# Patient Record
Sex: FEMALE | URBAN - METROPOLITAN AREA
[De-identification: names, ages, dates, MRNs, and addresses within clinical notes are randomized per-mention and may not be internally consistent; named-entity substitution may affect disease eponyms.]

---

## 2017-09-07 ENCOUNTER — CHARTING TRANS (OUTPATIENT)
Dept: OTOLARYNGOLOGY | Age: 47
End: 2017-09-07

## 2017-09-07 ENCOUNTER — LAB SERVICES (OUTPATIENT)
Dept: OTHER | Age: 47
End: 2017-09-07

## 2017-09-09 LAB — FINAL REPORT: ABNORMAL

## 2017-09-12 ENCOUNTER — CHARTING TRANS (OUTPATIENT)
Dept: OTHER | Age: 47
End: 2017-09-12

## 2017-09-13 ENCOUNTER — CHARTING TRANS (OUTPATIENT)
Dept: OTHER | Age: 47
End: 2017-09-13

## 2017-09-25 ENCOUNTER — CHARTING TRANS (OUTPATIENT)
Dept: OTOLARYNGOLOGY | Age: 47
End: 2017-09-25

## 2017-09-25 ENCOUNTER — CHARTING TRANS (OUTPATIENT)
Dept: OTHER | Age: 47
End: 2017-09-25

## 2017-10-16 ENCOUNTER — CHARTING TRANS (OUTPATIENT)
Dept: OTOLARYNGOLOGY | Age: 47
End: 2017-10-16

## 2017-10-30 ENCOUNTER — CHARTING TRANS (OUTPATIENT)
Dept: OTOLARYNGOLOGY | Age: 47
End: 2017-10-30

## 2017-10-30 ENCOUNTER — CHARTING TRANS (OUTPATIENT)
Dept: OTHER | Age: 47
End: 2017-10-30

## 2018-01-05 ENCOUNTER — CHARTING TRANS (OUTPATIENT)
Dept: OTHER | Age: 48
End: 2018-01-05

## 2018-08-14 ENCOUNTER — CHARTING TRANS (OUTPATIENT)
Dept: OTHER | Age: 48
End: 2018-08-14

## 2018-08-14 ENCOUNTER — LAB SERVICES (OUTPATIENT)
Dept: OTHER | Age: 48
End: 2018-08-14

## 2018-08-14 LAB
BUN SERPL-MCNC: 16 MG/DL (ref 7–20)
CALCIUM SERPL-MCNC: 10.6 MG/DL (ref 8.6–10.6)
CHLORIDE SERPL-SCNC: 105 MMOL/L (ref 96–107)
CREAT SERPL-MCNC: 1.2 MG/DL (ref 0.5–1.4)
GFR SERPL CREATININE-BSD FRML MDRD: 50 ML/MIN/{1.73M2}
GFR SERPL CREATININE-BSD FRML MDRD: >60 ML/MIN/{1.73M2}
GLUCOSE SERPL-MCNC: 88 MG/DL (ref 70–200)
HCO3 SERPL-SCNC: 26 MMOL/L (ref 22–32)
POTASSIUM SERPL-SCNC: 4.5 MMOL/L (ref 3.5–5.3)
SODIUM SERPL-SCNC: 141 MMOL/L (ref 136–146)

## 2018-08-15 LAB — MAGNESIUM SERPL-MCNC: 1.6 MG/DL (ref 1.6–2.6)

## 2018-09-08 ENCOUNTER — LAB SERVICES (OUTPATIENT)
Dept: OTHER | Age: 48
End: 2018-09-08

## 2018-09-08 LAB — MAGNESIUM SERPL-MCNC: 1.6 MG/DL (ref 1.6–2.6)

## 2018-09-10 ENCOUNTER — CHARTING TRANS (OUTPATIENT)
Dept: OTHER | Age: 48
End: 2018-09-10

## 2018-09-11 ENCOUNTER — LAB SERVICES (OUTPATIENT)
Dept: OTHER | Age: 48
End: 2018-09-11

## 2018-09-11 ENCOUNTER — CHARTING TRANS (OUTPATIENT)
Dept: OTHER | Age: 48
End: 2018-09-11

## 2018-09-11 LAB
25(OH)D3 SERPL-MCNC: 38.1 NG/ML (ref 30–100)
ALBUMIN SERPL BCG-MCNC: 4.4 G/DL (ref 3.6–5.1)
ALP SERPL-CCNC: 120 U/L (ref 45–130)
ALT SERPL W/O P-5'-P-CCNC: 10 U/L (ref 7–34)
AST SERPL-CCNC: 18 U/L (ref 9–37)
BACTERIA: ABNORMAL
BILIRUB SERPL-MCNC: 0.8 MG/DL (ref 0–1)
BILIRUBIN URINE: NEGATIVE
BLOOD URINE: ABNORMAL
BUN SERPL-MCNC: 23 MG/DL (ref 7–20)
CALCIUM SERPL-MCNC: 9.8 MG/DL (ref 8.6–10.6)
CHLORIDE SERPL-SCNC: 106 MMOL/L (ref 96–107)
CLARITY: CLEAR
COLOR: YELLOW
CREAT SERPL-MCNC: 1 MG/DL (ref 0.5–1.4)
CREAT UR-MCNC: 165.4 MG/DL (ref 30–125)
DIFFERENTIAL TYPE: ABNORMAL
GFR SERPL CREATININE-BSD FRML MDRD: 56 ML/MIN/{1.73M2}
GFR SERPL CREATININE-BSD FRML MDRD: >60 ML/MIN/{1.73M2}
GLUCOSE P FAST SERPL-MCNC: 85 MG/DL (ref 60–100)
GLUCOSE QUALITATIVE U: NEGATIVE
HCO3 SERPL-SCNC: 25 MMOL/L (ref 22–32)
HEMATOCRIT: 41.4 % (ref 34–45)
HEMOGLOBIN: 13.5 G/DL (ref 11.2–15.7)
KETONES, URINE: NEGATIVE
LEUKOCYTE ESTERASE URINE: NEGATIVE
LYMPH PERCENT: 37 % (ref 20.5–51.1)
LYMPHOCYTE ABSOLUTE #: 2.7 10*3/UL (ref 1.2–3.4)
MEAN CORPUSCULAR HGB CONCENTRATION: 32.6 % (ref 32–36)
MEAN CORPUSCULAR HGB: 26.6 PG (ref 27–34)
MEAN CORPUSCULAR VOLUME: 81.7 FL (ref 79–95)
MEAN PLATELET VOLUME: 8.6 FL (ref 8.6–12.4)
MIXED %: 6.4 % (ref 4.3–12.9)
MIXED ABSOLUTE #: 0.5 10*3/UL (ref 0.2–0.9)
NEUTROPHIL ABSOLUTE #: 4.2 10*3/UL (ref 1.4–6.5)
NEUTROPHIL PERCENT: 56.6 % (ref 34–73.5)
NITRITE URINE: NEGATIVE
PH URINE: 5.5 (ref 5–7)
PHOSPHATE SERPL-MCNC: 3.8 MG/DL (ref 2.5–4.9)
PLATELET COUNT: 330 10*3/UL (ref 150–400)
POTASSIUM SERPL-SCNC: 4.5 MMOL/L (ref 3.5–5.3)
PROT SERPL-MCNC: 7.7 G/DL (ref 6.2–8.1)
PROT UR-MCNC: 6 MG/DL (ref 0–12)
PROT/CREAT 24H UR: 0.04 MG/MG (ref 0–0.2)
RED BLOOD CELL COUNT: 5.07 10*6/UL (ref 3.7–5.2)
RED BLOOD CELLS URINE: ABNORMAL (ref 0–3)
RED CELL DISTRIBUTION WIDTH: 13.6 % (ref 11.3–14.8)
SODIUM SERPL-SCNC: 141 MMOL/L (ref 136–146)
SPECIFIC GRAVITY URINE: >=1.03 (ref 1–1.03)
SQUAMOUS EPITHELIAL CELLS: ABNORMAL
URINE PROTEIN, QUAL (DIPSTICK): NEGATIVE
UROBILINOGEN URINE: 0.2
WHITE BLOOD CELL COUNT: 7.4 10*3/UL (ref 4–10)
WHITE BLOOD CELLS URINE: ABNORMAL (ref 0–5)

## 2018-09-12 LAB
MYCOPHENOLATE SERPL-MCNC: 1.9 UG/ML (ref 1–3.5)
TACROLIMUS BLD-MCNC: 6.4 NG/ML (ref 5–20)

## 2018-09-13 LAB — FAX RESULTS: NORMAL

## 2018-10-18 ENCOUNTER — LAB SERVICES (OUTPATIENT)
Dept: OTHER | Age: 48
End: 2018-10-18

## 2018-10-18 LAB — MAGNESIUM SERPL-MCNC: 1.7 MG/DL (ref 1.6–2.6)

## 2018-10-26 ENCOUNTER — ANCILLARY ORDERS (OUTPATIENT)
Dept: OTHER | Age: 48
End: 2018-10-26

## 2018-10-26 DIAGNOSIS — M25.522 PAIN IN LEFT ELBOW: ICD-10-CM

## 2018-11-23 ENCOUNTER — IMAGING SERVICES (OUTPATIENT)
Dept: OTHER | Age: 48
End: 2018-11-23

## 2018-11-28 VITALS — WEIGHT: 184 LBS | HEIGHT: 64 IN | BODY MASS INDEX: 31.41 KG/M2

## 2018-11-28 VITALS — WEIGHT: 185 LBS | BODY MASS INDEX: 31.58 KG/M2 | HEIGHT: 64 IN

## 2019-03-05 VITALS
TEMPERATURE: 97.9 F | OXYGEN SATURATION: 98 % | WEIGHT: 188 LBS | HEIGHT: 64 IN | HEART RATE: 88 BPM | DIASTOLIC BLOOD PRESSURE: 84 MMHG | SYSTOLIC BLOOD PRESSURE: 118 MMHG | BODY MASS INDEX: 32.1 KG/M2

## 2022-01-04 ENCOUNTER — OFFICE VISIT (OUTPATIENT)
Dept: FAMILY MEDICINE CLINIC | Facility: CLINIC | Age: 52
End: 2022-01-04
Payer: COMMERCIAL

## 2022-01-04 VITALS
BODY MASS INDEX: 31.92 KG/M2 | WEIGHT: 187 LBS | TEMPERATURE: 98 F | RESPIRATION RATE: 16 BRPM | DIASTOLIC BLOOD PRESSURE: 84 MMHG | HEART RATE: 72 BPM | HEIGHT: 64 IN | SYSTOLIC BLOOD PRESSURE: 140 MMHG

## 2022-01-04 DIAGNOSIS — R25.2 CRAMPING OF FEET: ICD-10-CM

## 2022-01-04 DIAGNOSIS — R10.9 LEFT LATERAL ABDOMINAL PAIN: Primary | ICD-10-CM

## 2022-01-04 DIAGNOSIS — Z12.31 ENCOUNTER FOR SCREENING MAMMOGRAM FOR MALIGNANT NEOPLASM OF BREAST: ICD-10-CM

## 2022-01-04 DIAGNOSIS — M54.50 ACUTE LEFT-SIDED LOW BACK PAIN WITHOUT SCIATICA: ICD-10-CM

## 2022-01-04 DIAGNOSIS — Z94.0 KIDNEY TRANSPLANT RECIPIENT: ICD-10-CM

## 2022-01-04 DIAGNOSIS — R25.2 CRAMPING OF HANDS: ICD-10-CM

## 2022-01-04 PROBLEM — E66.9 OBESITY (BMI 30-39.9): Status: ACTIVE | Noted: 2018-03-05

## 2022-01-04 PROBLEM — R93.1 ABNORMAL ECHOCARDIOGRAM: Status: ACTIVE | Noted: 2018-03-28

## 2022-01-04 PROBLEM — E78.5 HYPERLIPIDEMIA: Status: ACTIVE | Noted: 2018-08-14

## 2022-01-04 PROBLEM — R01.1 CARDIAC MURMUR: Status: ACTIVE | Noted: 2018-03-05

## 2022-01-04 PROCEDURE — 3008F BODY MASS INDEX DOCD: CPT | Performed by: STUDENT IN AN ORGANIZED HEALTH CARE EDUCATION/TRAINING PROGRAM

## 2022-01-04 PROCEDURE — 99203 OFFICE O/P NEW LOW 30 MIN: CPT | Performed by: STUDENT IN AN ORGANIZED HEALTH CARE EDUCATION/TRAINING PROGRAM

## 2022-01-04 PROCEDURE — 3079F DIAST BP 80-89 MM HG: CPT | Performed by: STUDENT IN AN ORGANIZED HEALTH CARE EDUCATION/TRAINING PROGRAM

## 2022-01-04 PROCEDURE — 3077F SYST BP >= 140 MM HG: CPT | Performed by: STUDENT IN AN ORGANIZED HEALTH CARE EDUCATION/TRAINING PROGRAM

## 2022-01-04 RX ORDER — TIZANIDINE 4 MG/1
4 TABLET ORAL EVERY 8 HOURS PRN
Qty: 30 TABLET | Refills: 0 | Status: SHIPPED | OUTPATIENT
Start: 2022-01-04

## 2022-01-04 RX ORDER — ONDANSETRON 4 MG/1
1 TABLET, ORALLY DISINTEGRATING ORAL EVERY 8 HOURS PRN
COMMUNITY
Start: 2019-09-25 | End: 2022-01-04 | Stop reason: ALTCHOICE

## 2022-01-04 RX ORDER — ONDANSETRON 4 MG/1
4 TABLET, ORALLY DISINTEGRATING ORAL EVERY 8 HOURS PRN
Qty: 30 TABLET | Refills: 0 | Status: SHIPPED | OUTPATIENT
Start: 2022-01-04

## 2022-01-04 NOTE — PROGRESS NOTES
[facility] Family Medicine Office Note    HPI:     Remedios Nicholson is a 46year old female w/ hx focal scleral glomerulonephritis kidney transplant presenting for left lateral abdominal pain and back pain as well as cramping sensation in hands/f Cancer Paternal Grandfather    • Cancer Other    • Hypertension Mother    • Other (Osteoporosis [Other]) Mother       Social History: Social History    Tobacco Use      Smoking status: Never Smoker      Smokeless tobacco: Never Used    Vaping Use      Vapi encounter: 5' 4\" (1.626 m). Weight as of this encounter: 187 lb (84.8 kg). Vital signs reviewed. Appears stated age, well groomed. Physical Exam  Constitutional:       General: She is not in acute distress. Appearance: Normal appearance.    SADIQ quant viral load and BK virus DNA Quant PCR).      [facility] initially faxed over labs to [facility], however patient prefers [facility] Transplant Service at [phone] and endorsed to representative to fax over labs to [facility] at [facility]

## 2022-01-04 NOTE — PATIENT INSTRUCTIONS
Abdominal Pain  Abdominal pain is pain in the stomach or belly area. Everyone has this pain from time to time. In many cases it goes away on its own. But abdominal pain can sometimes be due to a serious problem, such as appendicitis.  So it’s important to vitamins, and supplements. Treating abdominal pain  Some causes of pain need emergency medical treatment right away. These include appendicitis or a bowel blockage. Other problems can be treated with rest, fluids, or medicines.  Your healthcare provider ca lie down. · Raise the head of your bed. Sussy last reviewed this educational content on 4/1/2019  © 3530-4605 [employer]. All rights reserved. This information is not intended as a substitute for professional medical care.  Always follow

## 2022-01-05 LAB
ALBUMIN/GLOBULIN RATIO: 1.3 (CALC) (ref 1–2.5)
ALBUMIN: 4.5 G/DL (ref 3.6–5.1)
ALKALINE PHOSPHATASE: 118 U/L (ref 37–153)
ALT: 15 U/L (ref 6–29)
AST: 20 U/L (ref 10–35)
BILIRUBIN, DIRECT: 0.2 MG/DL
BILIRUBIN, INDIRECT: 0.7 MG/DL (CALC) (ref 0.2–1.2)
BILIRUBIN, TOTAL: 0.9 MG/DL (ref 0.2–1.2)
GLOBULIN: 3.4 G/DL (CALC) (ref 1.9–3.7)
MAGNESIUM: 1.8 MG/DL (ref 1.5–2.5)
PROTEIN, TOTAL: 7.9 G/DL (ref 6.1–8.1)

## 2022-01-10 ENCOUNTER — OFFICE VISIT (OUTPATIENT)
Dept: NEPHROLOGY | Facility: CLINIC | Age: 52
End: 2022-01-10
Payer: COMMERCIAL

## 2022-01-10 VITALS
DIASTOLIC BLOOD PRESSURE: 88 MMHG | HEART RATE: 83 BPM | OXYGEN SATURATION: 99 % | RESPIRATION RATE: 17 BRPM | WEIGHT: 188.38 LBS | BODY MASS INDEX: 32.16 KG/M2 | HEIGHT: 64 IN | SYSTOLIC BLOOD PRESSURE: 130 MMHG

## 2022-01-10 DIAGNOSIS — Z94.0 RENAL TRANSPLANT RECIPIENT: Primary | ICD-10-CM

## 2022-01-10 PROCEDURE — 99203 OFFICE O/P NEW LOW 30 MIN: CPT | Performed by: INTERNAL MEDICINE

## 2022-01-10 PROCEDURE — 3079F DIAST BP 80-89 MM HG: CPT | Performed by: INTERNAL MEDICINE

## 2022-01-10 PROCEDURE — 3008F BODY MASS INDEX DOCD: CPT | Performed by: INTERNAL MEDICINE

## 2022-01-10 PROCEDURE — 3075F SYST BP GE 130 - 139MM HG: CPT | Performed by: INTERNAL MEDICINE

## 2022-01-10 NOTE — PROGRESS NOTES
Consult Note      REASON FOR CONSULT: Renal transplant recipient         HPI:   Naomi Patrick is a 46year old female with Patient presents with:  Consult: ref by Dr Marisela Brambila / MD Isaak Salcido was seen in nephro • Amoxicillin-Pot Clavulanate (AUGMENTIN) 500-125 MG Oral Tab Take 1 tablet by mouth 3 (three) times daily.  (Patient not taking: No sig reported)           Allergies:    Hydrocodone-Acetami*    OTHER (SEE COMMENTS), ANXIETY,                            NA blood and negative protein     ASSESSMENT/PLAN:        #1.  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   Fortunately she is done very well over the years and her most recent creatinine was normal.  Prograf level wa

## 2022-01-22 ENCOUNTER — HOSPITAL ENCOUNTER (OUTPATIENT)
Dept: MAMMOGRAPHY | Facility: HOSPITAL | Age: 52
Discharge: HOME | End: 2022-01-22
Attending: STUDENT IN AN ORGANIZED HEALTH CARE EDUCATION/TRAINING PROGRAM
Payer: COMMERCIAL

## 2022-01-22 DIAGNOSIS — Z12.31 ENCOUNTER FOR SCREENING MAMMOGRAM FOR MALIGNANT NEOPLASM OF BREAST: ICD-10-CM

## 2022-01-22 PROCEDURE — 77063 BREAST TOMOSYNTHESIS BI: CPT | Performed by: STUDENT IN AN ORGANIZED HEALTH CARE EDUCATION/TRAINING PROGRAM

## 2022-01-22 PROCEDURE — 77067 SCR MAMMO BI INCL CAD: CPT | Performed by: STUDENT IN AN ORGANIZED HEALTH CARE EDUCATION/TRAINING PROGRAM

## 2022-06-10 ENCOUNTER — TELEPHONE (OUTPATIENT)
Dept: FAMILY MEDICINE CLINIC | Facility: CLINIC | Age: 52
End: 2022-06-10

## 2022-06-27 ENCOUNTER — OFFICE VISIT (OUTPATIENT)
Dept: FAMILY MEDICINE CLINIC | Facility: CLINIC | Age: 52
End: 2022-06-27
Payer: COMMERCIAL

## 2022-06-27 VITALS
SYSTOLIC BLOOD PRESSURE: 120 MMHG | BODY MASS INDEX: 31.07 KG/M2 | OXYGEN SATURATION: 99 % | HEIGHT: 64 IN | DIASTOLIC BLOOD PRESSURE: 80 MMHG | TEMPERATURE: 98 F | WEIGHT: 182 LBS | RESPIRATION RATE: 16 BRPM | HEART RATE: 64 BPM

## 2022-06-27 DIAGNOSIS — Z11.51 SCREENING FOR HPV (HUMAN PAPILLOMAVIRUS): ICD-10-CM

## 2022-06-27 DIAGNOSIS — Z12.4 SCREENING FOR CERVICAL CANCER: ICD-10-CM

## 2022-06-27 DIAGNOSIS — Z12.83 SKIN EXAM, SCREENING FOR CANCER: ICD-10-CM

## 2022-06-27 DIAGNOSIS — Z23 NEED FOR VACCINATION AGAINST STREPTOCOCCUS PNEUMONIAE USING PNEUMOCOCCAL CONJUGATE VACCINE 13: ICD-10-CM

## 2022-06-27 DIAGNOSIS — Z23 NEED FOR SHINGLES VACCINE: ICD-10-CM

## 2022-06-27 DIAGNOSIS — Z00.00 ANNUAL PHYSICAL EXAM: Primary | ICD-10-CM

## 2022-06-27 DIAGNOSIS — Z12.11 COLON CANCER SCREENING: ICD-10-CM

## 2022-06-27 DIAGNOSIS — Z78.0 POST-MENOPAUSAL: ICD-10-CM

## 2022-06-27 DIAGNOSIS — Z00.00 LABORATORY EXAM ORDERED AS PART OF ROUTINE GENERAL MEDICAL EXAMINATION: ICD-10-CM

## 2022-06-27 PROCEDURE — 3074F SYST BP LT 130 MM HG: CPT | Performed by: STUDENT IN AN ORGANIZED HEALTH CARE EDUCATION/TRAINING PROGRAM

## 2022-06-27 PROCEDURE — 3008F BODY MASS INDEX DOCD: CPT | Performed by: STUDENT IN AN ORGANIZED HEALTH CARE EDUCATION/TRAINING PROGRAM

## 2022-06-27 PROCEDURE — 3079F DIAST BP 80-89 MM HG: CPT | Performed by: STUDENT IN AN ORGANIZED HEALTH CARE EDUCATION/TRAINING PROGRAM

## 2022-06-27 PROCEDURE — 99396 PREV VISIT EST AGE 40-64: CPT | Performed by: STUDENT IN AN ORGANIZED HEALTH CARE EDUCATION/TRAINING PROGRAM

## 2022-06-27 RX ORDER — MULTIVIT-MIN/IRON/FOLIC ACID/K 18-600-40
CAPSULE ORAL
COMMUNITY

## 2022-06-28 LAB
ABSOLUTE BASOPHILS: 23 CELLS/UL (ref 0–200)
ABSOLUTE EOSINOPHILS: 158 CELLS/UL (ref 15–500)
ABSOLUTE LYMPHOCYTES: 3315 CELLS/UL (ref 850–3900)
ABSOLUTE MONOCYTES: 473 CELLS/UL (ref 200–950)
ABSOLUTE NEUTROPHILS: 3533 CELLS/UL (ref 1500–7800)
ALBUMIN/GLOBULIN RATIO: 1.4 (CALC) (ref 1–2.5)
ALBUMIN: 4.5 G/DL (ref 3.6–5.1)
ALKALINE PHOSPHATASE: 113 U/L (ref 37–153)
ALT: 12 U/L (ref 6–29)
APPEARANCE: CLEAR
AST: 18 U/L (ref 10–35)
BASOPHILS: 0.3 %
BILIRUBIN, TOTAL: 1 MG/DL (ref 0.2–1.2)
BILIRUBIN: NEGATIVE
BUN: 17 MG/DL (ref 7–25)
CALCIUM: 10 MG/DL (ref 8.6–10.4)
CARBON DIOXIDE: 28 MMOL/L (ref 20–32)
CHLORIDE: 107 MMOL/L (ref 98–110)
CHOL/HDLC RATIO: 3.4 (CALC)
CHOLESTEROL, TOTAL: 222 MG/DL
COLOR: YELLOW
CREATININE, RANDOM URINE: 232 MG/DL (ref 20–275)
CREATININE: 1.01 MG/DL (ref 0.5–1.05)
EGFR IF AFRICN AM: 75 ML/MIN/1.73M2
EGFR IF NONAFRICN AM: 64 ML/MIN/1.73M2
EOSINOPHILS: 2.1 %
GLOBULIN: 3.2 G/DL (CALC) (ref 1.9–3.7)
GLUCOSE: 94 MG/DL (ref 65–99)
GLUCOSE: NEGATIVE
HDL CHOLESTEROL: 65 MG/DL
HEMATOCRIT: 41.8 % (ref 35–45)
HEMOGLOBIN A1C: 5.7 % OF TOTAL HGB
HEMOGLOBIN: 13.3 G/DL (ref 11.7–15.5)
KETONES: NEGATIVE
LDL-CHOLESTEROL: 142 MG/DL (CALC)
LEUKOCYTE ESTERASE: NEGATIVE
LYMPHOCYTES: 44.2 %
MCH: 26.2 PG (ref 27–33)
MCHC: 31.8 G/DL (ref 32–36)
MCV: 82.3 FL (ref 80–100)
MICROALBUMIN/CREATININE RATIO, RANDOM URINE: 11 MCG/MG CREAT
MICROALBUMIN: 2.5 MG/DL
MONOCYTES: 6.3 %
MPV: 9.6 FL (ref 7.5–12.5)
NEUTROPHILS: 47.1 %
NITRITE: NEGATIVE
NON-HDL CHOLESTEROL: 157 MG/DL (CALC)
OCCULT BLOOD: NEGATIVE
PLATELET COUNT: 336 THOUSAND/UL (ref 140–400)
POTASSIUM: 4.7 MMOL/L (ref 3.5–5.3)
PROTEIN, TOTAL: 7.7 G/DL (ref 6.1–8.1)
PROTEIN: NEGATIVE
RDW: 13.1 % (ref 11–15)
RED BLOOD CELL COUNT: 5.08 MILLION/UL (ref 3.8–5.1)
SODIUM: 142 MMOL/L (ref 135–146)
SPECIFIC GRAVITY: 1.03 (ref 1–1.03)
TACROLIMUS, HIGHLY SENSITIVE, /MS/MS: 14.8 MCG/L
TRIGLYCERIDES: 55 MG/DL
WHITE BLOOD CELL COUNT: 7.5 THOUSAND/UL (ref 3.8–10.8)

## 2022-06-30 LAB — HPV MRNA E6/E7: DETECTED

## 2022-07-01 ENCOUNTER — TELEPHONE (OUTPATIENT)
Dept: FAMILY MEDICINE CLINIC | Facility: CLINIC | Age: 52
End: 2022-07-01

## 2022-07-01 NOTE — TELEPHONE ENCOUNTER
Spoke to patient. She was really worried about the pap results. I assured her HPV is not uncommon, and at times it could lie dormant. Abnormal cells are low grade rather than high grade. Patient told not to worry over weekend. Labs also reviewed with her and Doc's recommendations. All questions answered. Told her we would call her Tues.

## 2022-07-05 DIAGNOSIS — R87.612 LGSIL ON PAP SMEAR OF CERVIX: Primary | ICD-10-CM

## 2022-07-05 NOTE — TELEPHONE ENCOUNTER
Patient called and informed of pap smear results and provided gyne referral for further management.      Dahiana Chinchilla MD, 07/05/22, 8:41 AM

## 2022-07-07 ENCOUNTER — OFFICE VISIT (OUTPATIENT)
Dept: NEPHROLOGY | Facility: CLINIC | Age: 52
End: 2022-07-07
Payer: COMMERCIAL

## 2022-07-07 ENCOUNTER — TELEPHONE (OUTPATIENT)
Dept: FAMILY MEDICINE CLINIC | Facility: CLINIC | Age: 52
End: 2022-07-07

## 2022-07-07 ENCOUNTER — NURSE ONLY (OUTPATIENT)
Dept: FAMILY MEDICINE CLINIC | Facility: CLINIC | Age: 52
End: 2022-07-07
Payer: COMMERCIAL

## 2022-07-07 VITALS — WEIGHT: 181.13 LBS | BODY MASS INDEX: 31 KG/M2 | SYSTOLIC BLOOD PRESSURE: 130 MMHG | DIASTOLIC BLOOD PRESSURE: 80 MMHG

## 2022-07-07 DIAGNOSIS — Z94.0 RENAL TRANSPLANT RECIPIENT: Primary | ICD-10-CM

## 2022-07-07 DIAGNOSIS — Z23 NEED FOR VACCINATION AGAINST STREPTOCOCCUS PNEUMONIAE USING PNEUMOCOCCAL CONJUGATE VACCINE 13: Primary | ICD-10-CM

## 2022-07-07 DIAGNOSIS — Z12.11 SCREENING FOR COLON CANCER: Primary | ICD-10-CM

## 2022-07-07 DIAGNOSIS — Z23 NEED FOR SHINGLES VACCINE: ICD-10-CM

## 2022-07-07 DIAGNOSIS — Z94.0 KIDNEY TRANSPLANT RECIPIENT: Primary | ICD-10-CM

## 2022-07-07 PROCEDURE — 90471 IMMUNIZATION ADMIN: CPT | Performed by: STUDENT IN AN ORGANIZED HEALTH CARE EDUCATION/TRAINING PROGRAM

## 2022-07-07 PROCEDURE — 3079F DIAST BP 80-89 MM HG: CPT | Performed by: INTERNAL MEDICINE

## 2022-07-07 PROCEDURE — 99214 OFFICE O/P EST MOD 30 MIN: CPT | Performed by: INTERNAL MEDICINE

## 2022-07-07 PROCEDURE — 90750 HZV VACC RECOMBINANT IM: CPT | Performed by: STUDENT IN AN ORGANIZED HEALTH CARE EDUCATION/TRAINING PROGRAM

## 2022-07-07 PROCEDURE — 3075F SYST BP GE 130 - 139MM HG: CPT | Performed by: INTERNAL MEDICINE

## 2022-07-07 PROCEDURE — 90472 IMMUNIZATION ADMIN EACH ADD: CPT | Performed by: STUDENT IN AN ORGANIZED HEALTH CARE EDUCATION/TRAINING PROGRAM

## 2022-07-07 PROCEDURE — 90677 PCV20 VACCINE IM: CPT | Performed by: STUDENT IN AN ORGANIZED HEALTH CARE EDUCATION/TRAINING PROGRAM

## 2022-07-07 NOTE — TELEPHONE ENCOUNTER
Pt stopped by the  to request a referral for nephrology. Pt states she needs a referral for appointments through January.      Raymond Raya NPI  [id number]  Address  . [id number],  [address] [place] [address]  [id number]  Phone Number  [phone]

## 2022-07-07 NOTE — TELEPHONE ENCOUNTER
Previous gastro referral out of network, placing new referral.     Kandice Garcia MD, 07/07/22, 8:19 AM

## 2022-07-14 ENCOUNTER — MED REC SCAN ONLY (OUTPATIENT)
Dept: FAMILY MEDICINE CLINIC | Facility: CLINIC | Age: 52
End: 2022-07-14

## 2022-07-18 ENCOUNTER — TELEPHONE (OUTPATIENT)
Dept: GASTROENTEROLOGY | Facility: CLINIC | Age: 52
End: 2022-07-18

## 2022-07-18 ENCOUNTER — OFFICE VISIT (OUTPATIENT)
Dept: GASTROENTEROLOGY | Facility: CLINIC | Age: 52
End: 2022-07-18
Payer: COMMERCIAL

## 2022-07-18 VITALS
HEIGHT: 64 IN | WEIGHT: 182 LBS | SYSTOLIC BLOOD PRESSURE: 111 MMHG | BODY MASS INDEX: 31.07 KG/M2 | HEART RATE: 83 BPM | DIASTOLIC BLOOD PRESSURE: 70 MMHG

## 2022-07-18 DIAGNOSIS — K21.9 GASTROESOPHAGEAL REFLUX DISEASE, UNSPECIFIED WHETHER ESOPHAGITIS PRESENT: ICD-10-CM

## 2022-07-18 DIAGNOSIS — Z12.11 COLON CANCER SCREENING: Primary | ICD-10-CM

## 2022-07-18 DIAGNOSIS — Z12.11 ENCOUNTER FOR SCREENING COLONOSCOPY: Primary | ICD-10-CM

## 2022-07-18 RX ORDER — POLYETHYLENE GLYCOL 3350, SODIUM CHLORIDE, SODIUM BICARBONATE, POTASSIUM CHLORIDE 420; 11.2; 5.72; 1.48 G/4L; G/4L; G/4L; G/4L
POWDER, FOR SOLUTION ORAL
Qty: 1 EACH | Refills: 0 | Status: SHIPPED | OUTPATIENT
Start: 2022-07-18

## 2022-07-18 RX ORDER — MULTIVITAMIN
1 TABLET ORAL DAILY
COMMUNITY

## 2022-07-18 NOTE — TELEPHONE ENCOUNTER
Scheduled for:  Colonoscopy [address] ,[address]   Provider Name:  Dr. Timothy Banuelos  Date:  9/6/22  Location:  [facility]  Sedation:  Mac   Time:  0930 Am (pt is aware that [facility] will call the day before to confirm arrival time)     Prep:Colyte or Trilyte /Egd ,Dulcolax for 3 days  prior to drinking the bowel prep   Prep instructions were given to pt in the office, pt verbalized understanding. Meds/Allergies Reconciled?:  Physician reviewed     Diagnosis with codes:  Colon scrn - Z12.11 , Debarah Seth - K21.9   Was patient informed to call insurance with codes (Y/N):  Yes, I confirmed [facility]  insurance with the patient. The patient also verbally understands to call her  insurance to check for pre-cert, codes were given on prep instructions. Referral sent?:  Yes   [facility] or [address] notified?:  Electronic case request was sent to [facility] via [employer]. Medication Orders: This patient verbally confirmed that she  is not taking:   Iron, blood thinners, BP meds, and is not diabetic   Not latex allergy, Not PCN allergy and does not have a pacemaker Pt is aware to NOT take iron pills, herbal meds and diet supplements for 7 days before exam. Also to NOT take any form of alcohol, recreational drugs and any forms of ED meds 24 hours before exam.    Misc Orders:  Patient was informed that they will need a COVID 19 test prior to their procedure. Patient verbally understood & will await a phone call from [facility] to schedule.       Further instructions given by staff:

## 2022-07-18 NOTE — PATIENT INSTRUCTIONS
# Average Risk screening: patient is considered average risk for colon cancer (grandma family hx of colon cancer) and it is appropriate to proceed with screening colonoscopy. Patient is currently asymptomatic and denies diarrhea, hematochezia, thin-stools or weight loss. We discussed risks/benefits/alternatives to procedure, including CT colonography and stool testing, they want to proceed with colonoscopy. # acid reflux  # constipation    Recommend:  -Schedule EGD/colonoscopy w/MAC for screening and acid reflux  -Prep: -Buy over the counter dulcolax laxative, and take daily for 3 days prior to drinking the bowel prep.    Split dose Colyte or equivalent    ** If MAC @ [facility]:    - NO alcohol, recreational drugs nor erectile dysfunction mediations 24 hours before procedure(s)   - NO herbal supplements or weight loss medications x 7 days prior to the procedure(s)    ** If MAC @ [facility] or IV twilit - continue all medications as prescribed    COVID 72 hours prior

## 2022-08-02 LAB
BUN/CREATININE RATIO: 17 (CALC) (ref 6–22)
BUN: 19 MG/DL (ref 7–25)
CALCIUM: 10 MG/DL (ref 8.6–10.4)
CARBON DIOXIDE: 25 MMOL/L (ref 20–32)
CHLORIDE: 107 MMOL/L (ref 98–110)
CREATININE: 1.11 MG/DL (ref 0.5–1.03)
EGFR: 60 ML/MIN/1.73M2
GLUCOSE: 88 MG/DL (ref 65–99)
POTASSIUM: 4.3 MMOL/L (ref 3.5–5.3)
SODIUM: 142 MMOL/L (ref 135–146)
TACROLIMUS, HIGHLY SENSITIVE, /MS/MS: 6.4 MCG/L

## 2022-08-04 ENCOUNTER — OFFICE VISIT (OUTPATIENT)
Dept: OBGYN CLINIC | Facility: CLINIC | Age: 52
End: 2022-08-04
Payer: COMMERCIAL

## 2022-08-04 VITALS
SYSTOLIC BLOOD PRESSURE: 128 MMHG | HEART RATE: 78 BPM | DIASTOLIC BLOOD PRESSURE: 76 MMHG | BODY MASS INDEX: 30.93 KG/M2 | WEIGHT: 181.19 LBS | HEIGHT: 64 IN

## 2022-08-04 DIAGNOSIS — R87.810 CERVICAL HIGH RISK HUMAN PAPILLOMAVIRUS (HPV) DNA TEST POSITIVE: ICD-10-CM

## 2022-08-04 DIAGNOSIS — R87.612 PAPANICOLAOU SMEAR OF CERVIX WITH LOW GRADE SQUAMOUS INTRAEPITHELIAL LESION (LGSIL): ICD-10-CM

## 2022-08-04 PROCEDURE — 3078F DIAST BP <80 MM HG: CPT | Performed by: STUDENT IN AN ORGANIZED HEALTH CARE EDUCATION/TRAINING PROGRAM

## 2022-08-04 PROCEDURE — 88342 IMHCHEM/IMCYTCHM 1ST ANTB: CPT | Performed by: STUDENT IN AN ORGANIZED HEALTH CARE EDUCATION/TRAINING PROGRAM

## 2022-08-04 PROCEDURE — 3074F SYST BP LT 130 MM HG: CPT | Performed by: STUDENT IN AN ORGANIZED HEALTH CARE EDUCATION/TRAINING PROGRAM

## 2022-08-04 PROCEDURE — 3008F BODY MASS INDEX DOCD: CPT | Performed by: STUDENT IN AN ORGANIZED HEALTH CARE EDUCATION/TRAINING PROGRAM

## 2022-08-04 PROCEDURE — 88305 TISSUE EXAM BY PATHOLOGIST: CPT | Performed by: STUDENT IN AN ORGANIZED HEALTH CARE EDUCATION/TRAINING PROGRAM

## 2022-08-04 PROCEDURE — 57456 ENDOCERV CURETTAGE W/SCOPE: CPT | Performed by: STUDENT IN AN ORGANIZED HEALTH CARE EDUCATION/TRAINING PROGRAM

## 2022-08-04 NOTE — PROGRESS NOTES
Colposcopy Procedure Note    Date of Procedure: 08/04/22    Pre-procedure diagnosis:   HPV positive pap smear    Post-procedure diagnosis:  same    Procedure:   A discussion was held with patient including diagnosis, prognosis, and management. Recommendation made for colposcopy with possible biopsies. Risks, benefits and alteratives were discussed. The patient agreed to proceed with procedure. She provided written and verbal consent. Dorsal lithotomy position. A speculum was placed in the vagina and the cervix was visualized. The vagina appeared normal with no lesions or discolorations noted. The cervix was then swabbed with acetic acid. Could not visualize the entire squamocolumnar junction - cervix postmenopausal/atrophic  No acetowhite changes. No mosaicism. No punctation. Cervical biopsy: none  Endocervical curettage: taken    Instruments were removed from the vagina. All tissue specimens were sent to pathology. Impression: HOMERO 1 vs HPV changes, pending final pathology     Disposition: RTC pending pathology results.      Yoli Wynne MD   [facility] - OBGYN

## 2022-08-05 ENCOUNTER — TELEPHONE (OUTPATIENT)
Dept: NEPHROLOGY | Facility: CLINIC | Age: 52
End: 2022-08-05

## 2022-08-05 NOTE — TELEPHONE ENCOUNTER
Patient called to let you know she had her labs done on 8/1 and wants to know if she should continue the same dosage of tacrolimus, please advise?

## 2022-08-10 ENCOUNTER — TELEPHONE (OUTPATIENT)
Dept: OBGYN CLINIC | Facility: CLINIC | Age: 52
End: 2022-08-10

## 2022-08-31 ENCOUNTER — TELEPHONE (OUTPATIENT)
Dept: GASTROENTEROLOGY | Facility: CLINIC | Age: 52
End: 2022-08-31

## 2022-08-31 NOTE — TELEPHONE ENCOUNTER
Patient has questions regarding meds for 9/6/2022 CLN & EGD - takes meds at 9am daily. Please call. Thank you.

## 2022-08-31 NOTE — TELEPHONE ENCOUNTER
Spoke with Sheela osorio/ sony davis. Addressed additional questions in relation to medications. Ensured OK to continue tacrolimus (prograf). No further questions/concerns.

## 2022-09-06 ENCOUNTER — TELEPHONE (OUTPATIENT)
Dept: GASTROENTEROLOGY | Facility: CLINIC | Age: 52
End: 2022-09-06

## 2022-09-06 ENCOUNTER — SURGERY CENTER DOCUMENTATION (OUTPATIENT)
Dept: SURGERY | Age: 52
End: 2022-09-06

## 2022-09-06 PROCEDURE — 43239 EGD BIOPSY SINGLE/MULTIPLE: CPT | Performed by: INTERNAL MEDICINE

## 2022-09-06 PROCEDURE — 45378 DIAGNOSTIC COLONOSCOPY: CPT | Performed by: INTERNAL MEDICINE

## 2022-09-06 RX ORDER — PANTOPRAZOLE SODIUM 40 MG/1
40 TABLET, DELAYED RELEASE ORAL
Qty: 90 TABLET | Refills: 3 | Status: SHIPPED | OUTPATIENT
Start: 2022-09-06 | End: 2023-09-01

## 2022-09-06 NOTE — TELEPHONE ENCOUNTER
Esophageal scarring and large hiatal hernia   Likely scarring from acid reflux biopsied for EoE    Start pantoprazole 40 mg daily before breakfast. Told patient to review to pharmacy and make sure no drug interactions.      Patient understood

## 2022-09-07 ENCOUNTER — TELEPHONE (OUTPATIENT)
Dept: GASTROENTEROLOGY | Facility: CLINIC | Age: 52
End: 2022-09-07

## 2022-09-07 DIAGNOSIS — K21.9 GASTROESOPHAGEAL REFLUX DISEASE, UNSPECIFIED WHETHER ESOPHAGITIS PRESENT: Primary | ICD-10-CM

## 2022-09-07 NOTE — TELEPHONE ENCOUNTER
pt states that she had a proc done yesterday, and pt is not feeling well. pts states that she has nausea, having back pain, and pt is not able to eat and when she does eat she vomits the food up. Pt states that she back to liquid diet since she is not able to eat solid foods.

## 2022-09-07 NOTE — TELEPHONE ENCOUNTER
Dr. Ky Rainey--    Underwent colon/egd yesterday which feeling unwell since procedure. Ate potatoes late afternoon s/p procedure with severe n/v precipitating meal.     Endorses lower back pain which is sharp, nagging, constant. Pain developed overnight. Rated 6/10 currently. Denies back pain like this previously. Tried cream of wheat this am her medication but vomited everything immediately. Unable to take dose of pantoprazole 2/2 sx. Bm this morning formed. Denies melena and/or tarry stools. Denies fevers/chills, abdominal pain, reflux, dysphagia. Keeping down fluids with small sips. Concerned about back pain as unsure if related to procedure. Please advise on how to proceed.      Thank you

## 2022-09-08 ENCOUNTER — TELEPHONE (OUTPATIENT)
Dept: GASTROENTEROLOGY | Facility: CLINIC | Age: 52
End: 2022-09-08

## 2022-09-08 NOTE — TELEPHONE ENCOUNTER
Dr. Harvey Garcia--    Attached colonoscopy/egd results performed 9/6/22. Please advise on pathology findings and recommendations. Thank you! Sent [facility] pathology report to scan.

## 2022-09-09 NOTE — TELEPHONE ENCOUNTER
Scheduled for:   8 week follow up [address]   Provider Name:  Dr Miquel Mejía  Date:  12/05/2022  Location:   [facility] under lying heart issues   Sedation:  MAC  Time:  1130 (pt is aware to arrive at 1030 )   Prep:  NPO  Meds/Allergies Reconciled?:  Physician reviewed   Diagnosis with codes: abnormal Ct R93.89     Was patient informed to call insurance with codes (Y/N):  Yes, I confirmed [facility] insurance with the patient. Referral sent?:  N/A  [facility] or [facility] notified?:  I sent an electronic request to Endo Scheduling and received a confirmation today. Medication Orders: This patient verbally confirmed that she  is not taking:   Iron, blood thinners, BP meds, and is not diabetic   Not latex allergy, Not PCN allergy and does not have a pacemaker  Patient Is aware to hold her vitamins and supplements x 7 day prior to procedure      Misc Orders:       Further instructions given by staff:    This patient had no questions regarding the prep instructions

## 2022-09-09 NOTE — TELEPHONE ENCOUNTER
GI Schedulers--    Please assist with scheduling egd w/ possible dil per Dr. Turner Pilar orders below. Thank you!

## 2022-09-09 NOTE — TELEPHONE ENCOUNTER
I was on vacation from 9/7-9/13 back in the office on 9/14  Message should have gone to the on call provider    I did call the patient today 9/8 ay 645 pm  She states she is feeling better  Nausea is ok with her zofran she has  She feels bloated and constipated  Chest pain is better, eating a little    Discussed if severe pain to there ER  Continue reflux precautions and PPI daily    Reviewed pathology with patient  Possible barretts, reflux and EOE    GI Staff/schedulers schedule repeat EGD in 8 week    Recommend:  -Schedule EGD w/ possible biopsy/dilation w/MAC for follow up abnormal EGD    ** If MAC @ [facility]:    - NO alcohol, recreational drugs nor erectile dysfunction mediations 24 hours before procedure(s)   - NO herbal supplements or weight loss medications x 7 days prior to the procedure(s)    ** If MAC @ [facility] or IV twilight - continue all medications as prescribed

## 2022-09-14 NOTE — TELEPHONE ENCOUNTER
See telephone encounter  As expected reflux or EOE  Needs follow up EGD    GI staff: please place recall in for colonoscopy in 10 years

## 2022-09-14 NOTE — TELEPHONE ENCOUNTER
Entered into Epic. Recall CLN in 10 years per Dr. Jennifer Spurling. Last CLN done 09/06/2022. Recall entered into Patient Outreach for 09/06/2032. Health Maintenance updated. Scheduled f/u EGD 12/5/2022.

## 2022-11-02 ENCOUNTER — TELEPHONE (OUTPATIENT)
Dept: NEPHROLOGY | Facility: CLINIC | Age: 52
End: 2022-11-02

## 2022-11-02 RX ORDER — TACROLIMUS 1 MG/1
CAPSULE ORAL
Qty: 630 CAPSULE | Refills: 1 | Status: SHIPPED | OUTPATIENT
Start: 2022-11-02

## 2022-11-02 NOTE — TELEPHONE ENCOUNTER
Patient requesting refill on tacrolimus    Pharmacy is     [id number] - [address] AT [address] [place] [address], [phone], [phone]

## 2022-11-14 ENCOUNTER — OFFICE VISIT (OUTPATIENT)
Dept: FAMILY MEDICINE CLINIC | Facility: CLINIC | Age: 52
End: 2022-11-14
Payer: COMMERCIAL

## 2022-11-14 ENCOUNTER — HOSPITAL ENCOUNTER (OUTPATIENT)
Dept: GENERAL RADIOLOGY | Age: 52
Discharge: HOME | End: 2022-11-14
Attending: STUDENT IN AN ORGANIZED HEALTH CARE EDUCATION/TRAINING PROGRAM
Payer: COMMERCIAL

## 2022-11-14 VITALS
WEIGHT: 183 LBS | HEIGHT: 64 IN | BODY MASS INDEX: 31.24 KG/M2 | SYSTOLIC BLOOD PRESSURE: 140 MMHG | RESPIRATION RATE: 20 BRPM | OXYGEN SATURATION: 99 % | DIASTOLIC BLOOD PRESSURE: 80 MMHG | HEART RATE: 82 BPM | TEMPERATURE: 98 F

## 2022-11-14 DIAGNOSIS — Z23 NEED FOR SHINGLES VACCINE: ICD-10-CM

## 2022-11-14 DIAGNOSIS — M25.561 CHRONIC PAIN OF RIGHT KNEE: Primary | ICD-10-CM

## 2022-11-14 DIAGNOSIS — G89.29 CHRONIC PAIN OF RIGHT KNEE: Primary | ICD-10-CM

## 2022-11-14 DIAGNOSIS — M25.561 CHRONIC PAIN OF RIGHT KNEE: ICD-10-CM

## 2022-11-14 DIAGNOSIS — G89.29 CHRONIC PAIN OF RIGHT KNEE: ICD-10-CM

## 2022-11-14 DIAGNOSIS — R03.0 ELEVATED BLOOD PRESSURE READING: ICD-10-CM

## 2022-11-14 PROCEDURE — 3077F SYST BP >= 140 MM HG: CPT | Performed by: STUDENT IN AN ORGANIZED HEALTH CARE EDUCATION/TRAINING PROGRAM

## 2022-11-14 PROCEDURE — 90471 IMMUNIZATION ADMIN: CPT | Performed by: STUDENT IN AN ORGANIZED HEALTH CARE EDUCATION/TRAINING PROGRAM

## 2022-11-14 PROCEDURE — 90750 HZV VACC RECOMBINANT IM: CPT | Performed by: STUDENT IN AN ORGANIZED HEALTH CARE EDUCATION/TRAINING PROGRAM

## 2022-11-14 PROCEDURE — 3079F DIAST BP 80-89 MM HG: CPT | Performed by: STUDENT IN AN ORGANIZED HEALTH CARE EDUCATION/TRAINING PROGRAM

## 2022-11-14 PROCEDURE — 3008F BODY MASS INDEX DOCD: CPT | Performed by: STUDENT IN AN ORGANIZED HEALTH CARE EDUCATION/TRAINING PROGRAM

## 2022-11-14 PROCEDURE — 99213 OFFICE O/P EST LOW 20 MIN: CPT | Performed by: STUDENT IN AN ORGANIZED HEALTH CARE EDUCATION/TRAINING PROGRAM

## 2022-11-14 PROCEDURE — 73562 X-RAY EXAM OF KNEE 3: CPT | Performed by: STUDENT IN AN ORGANIZED HEALTH CARE EDUCATION/TRAINING PROGRAM

## 2022-11-14 RX ORDER — COVID-19 MOLECULAR TEST ASSAY
KIT MISCELLANEOUS
COMMUNITY
Start: 2022-09-06 | End: 2022-11-14 | Stop reason: ALTCHOICE

## 2022-12-02 ENCOUNTER — LAB ENCOUNTER (OUTPATIENT)
Dept: LAB | Facility: HOSPITAL | Age: 52
End: 2022-12-02
Attending: INTERNAL MEDICINE
Payer: COMMERCIAL

## 2022-12-02 DIAGNOSIS — Z01.818 PRE-OP TESTING: ICD-10-CM

## 2022-12-03 ENCOUNTER — TELEPHONE (OUTPATIENT)
Dept: GASTROENTEROLOGY | Facility: CLINIC | Age: 52
End: 2022-12-03

## 2022-12-03 DIAGNOSIS — R93.89 ABNORMAL CT SCAN: Primary | ICD-10-CM

## 2022-12-03 LAB — SARS-COV-2 RNA RESP QL NAA+PROBE: DETECTED

## 2022-12-03 NOTE — TELEPHONE ENCOUNTER
I called the patient as I was notified by endo RN SHIRA that the patient tested positive for COVID. I told the patient of the positive test. She says she feels fine. Asked her to contact her primary care physician for any further recommendations and management of this.     Discussed procedure Monday 12/5 with Dr. Oscar Chambers will be cancelled and needs to be rescheduled    GI Staff:    Please reschedule the patient's procedure with Dr. Landon Elliott MD  [address] - Gastroenterology  12/3/2022  3:13 PM

## 2022-12-05 ENCOUNTER — TELEPHONE (OUTPATIENT)
Dept: FAMILY MEDICINE CLINIC | Facility: CLINIC | Age: 52
End: 2022-12-05

## 2022-12-05 NOTE — TELEPHONE ENCOUNTER
Viviana Fleming is calling because she tested positive for covid on Sat, she is not having any symptoms, she just wanted to let Dr Avis Rhoades know, Questions or concerns please call Sheela at [phone]

## 2022-12-21 ENCOUNTER — TELEPHONE (OUTPATIENT)
Dept: FAMILY MEDICINE CLINIC | Facility: CLINIC | Age: 52
End: 2022-12-21

## 2022-12-21 DIAGNOSIS — Z94.0 KIDNEY TRANSPLANT RECIPIENT: Primary | ICD-10-CM

## 2022-12-21 NOTE — TELEPHONE ENCOUNTER
Dr. Vasu Aj office calling to ask for another referral.  Pt has appt on 23 and current referral will be  by then.     PROVIDER: [facility]    DX: Kidney transplant recipient (Z94.0)

## 2022-12-22 NOTE — TELEPHONE ENCOUNTER
Referral has been placed, please inform office of Dr. Santhosh Weiss (Office phone: [phone])    Giovany Stinson MD, 12/21/22, 7:34 PM

## 2022-12-28 DIAGNOSIS — Z94.0 STATUS POST KIDNEY TRANSPLANT: Primary | ICD-10-CM

## 2023-01-07 LAB
ABSOLUTE BASOPHILS: 23 CELLS/UL (ref 0–200)
ABSOLUTE EOSINOPHILS: 148 CELLS/UL (ref 15–500)
ABSOLUTE LYMPHOCYTES: 2878 CELLS/UL (ref 850–3900)
ABSOLUTE MONOCYTES: 476 CELLS/UL (ref 200–950)
ABSOLUTE NEUTROPHILS: 4274 CELLS/UL (ref 1500–7800)
ALBUMIN/GLOBULIN RATIO: 1.5 (CALC) (ref 1–2.5)
ALBUMIN: 4.4 G/DL (ref 3.6–5.1)
ALKALINE PHOSPHATASE: 121 U/L (ref 37–153)
ALT: 14 U/L (ref 6–29)
AST: 18 U/L (ref 10–35)
BASOPHILS: 0.3 %
BILIRUBIN, TOTAL: 0.8 MG/DL (ref 0.2–1.2)
BUN/CREATININE RATIO: 17 (CALC) (ref 6–22)
BUN: 18 MG/DL (ref 7–25)
CALCIUM: 9.8 MG/DL (ref 8.6–10.4)
CARBON DIOXIDE: 30 MMOL/L (ref 20–32)
CHLORIDE: 105 MMOL/L (ref 98–110)
CHOL/HDLC RATIO: 2.9 (CALC)
CHOLESTEROL, TOTAL: 208 MG/DL
CREATININE: 1.08 MG/DL (ref 0.5–1.03)
EGFR: 62 ML/MIN/1.73M2
EOSINOPHILS: 1.9 %
GLOBULIN: 3 G/DL (CALC) (ref 1.9–3.7)
GLUCOSE: 87 MG/DL (ref 65–99)
HDL CHOLESTEROL: 72 MG/DL
HEMATOCRIT: 44.7 % (ref 35–45)
HEMOGLOBIN: 14.3 G/DL (ref 11.7–15.5)
LDL-CHOLESTEROL: 121 MG/DL (CALC)
LYMPHOCYTES: 36.9 %
MCH: 26.4 PG (ref 27–33)
MCHC: 32 G/DL (ref 32–36)
MCV: 82.6 FL (ref 80–100)
MONOCYTES: 6.1 %
MPV: 9.6 FL (ref 7.5–12.5)
NEUTROPHILS: 54.8 %
NON-HDL CHOLESTEROL: 136 MG/DL (CALC)
PLATELET COUNT: 376 THOUSAND/UL (ref 140–400)
POTASSIUM: 4.5 MMOL/L (ref 3.5–5.3)
PROTEIN, TOTAL: 7.4 G/DL (ref 6.1–8.1)
RDW: 12.7 % (ref 11–15)
RED BLOOD CELL COUNT: 5.41 MILLION/UL (ref 3.8–5.1)
SODIUM: 141 MMOL/L (ref 135–146)
TACROLIMUS, HIGHLY SENSITIVE, /MS/MS: 4.2 MCG/L
TRIGLYCERIDES: 62 MG/DL
WHITE BLOOD CELL COUNT: 7.8 THOUSAND/UL (ref 3.8–10.8)

## 2023-02-01 ENCOUNTER — OFFICE VISIT (OUTPATIENT)
Dept: NEPHROLOGY | Facility: CLINIC | Age: 53
End: 2023-02-01
Payer: COMMERCIAL

## 2023-02-01 VITALS — WEIGHT: 185.5 LBS | DIASTOLIC BLOOD PRESSURE: 90 MMHG | BODY MASS INDEX: 32 KG/M2 | SYSTOLIC BLOOD PRESSURE: 152 MMHG

## 2023-02-01 DIAGNOSIS — E78.5 HYPERLIPIDEMIA, UNSPECIFIED HYPERLIPIDEMIA TYPE: ICD-10-CM

## 2023-02-01 DIAGNOSIS — Z94.0 RENAL TRANSPLANT RECIPIENT: Primary | ICD-10-CM

## 2023-02-01 DIAGNOSIS — I10 ESSENTIAL HYPERTENSION: ICD-10-CM

## 2023-02-01 PROCEDURE — 3077F SYST BP >= 140 MM HG: CPT | Performed by: INTERNAL MEDICINE

## 2023-02-01 PROCEDURE — 3080F DIAST BP >= 90 MM HG: CPT | Performed by: INTERNAL MEDICINE

## 2023-02-01 PROCEDURE — 99214 OFFICE O/P EST MOD 30 MIN: CPT | Performed by: INTERNAL MEDICINE

## 2023-02-01 RX ORDER — LOSARTAN POTASSIUM 25 MG/1
25 TABLET ORAL DAILY
Qty: 90 TABLET | Refills: 3 | Status: SHIPPED | OUTPATIENT
Start: 2023-02-01

## 2023-02-01 RX ORDER — ATORVASTATIN CALCIUM 20 MG/1
20 TABLET, FILM COATED ORAL NIGHTLY
Qty: 90 TABLET | Refills: 3 | Status: SHIPPED | OUTPATIENT
Start: 2023-02-01

## 2023-02-06 ENCOUNTER — OFFICE VISIT (OUTPATIENT)
Dept: FAMILY MEDICINE CLINIC | Facility: CLINIC | Age: 53
End: 2023-02-06
Payer: COMMERCIAL

## 2023-02-06 VITALS
OXYGEN SATURATION: 99 % | WEIGHT: 178 LBS | TEMPERATURE: 98 F | RESPIRATION RATE: 20 BRPM | SYSTOLIC BLOOD PRESSURE: 126 MMHG | BODY MASS INDEX: 30.39 KG/M2 | HEIGHT: 64 IN | HEART RATE: 80 BPM | DIASTOLIC BLOOD PRESSURE: 74 MMHG

## 2023-02-06 DIAGNOSIS — Z12.4 SCREENING FOR CERVICAL CANCER: ICD-10-CM

## 2023-02-06 DIAGNOSIS — N28.89 HYPERTENSION SECONDARY TO OTHER RENAL DISORDERS: Primary | ICD-10-CM

## 2023-02-06 DIAGNOSIS — H20.9 IRITIS: ICD-10-CM

## 2023-02-06 DIAGNOSIS — R43.2 ABNORMAL TASTE IN MOUTH: ICD-10-CM

## 2023-02-06 DIAGNOSIS — Z12.31 ENCOUNTER FOR SCREENING MAMMOGRAM FOR MALIGNANT NEOPLASM OF BREAST: ICD-10-CM

## 2023-02-06 DIAGNOSIS — I15.1 HYPERTENSION SECONDARY TO OTHER RENAL DISORDERS: Primary | ICD-10-CM

## 2023-02-06 PROCEDURE — 3074F SYST BP LT 130 MM HG: CPT | Performed by: STUDENT IN AN ORGANIZED HEALTH CARE EDUCATION/TRAINING PROGRAM

## 2023-02-06 PROCEDURE — 3008F BODY MASS INDEX DOCD: CPT | Performed by: STUDENT IN AN ORGANIZED HEALTH CARE EDUCATION/TRAINING PROGRAM

## 2023-02-06 PROCEDURE — 3078F DIAST BP <80 MM HG: CPT | Performed by: STUDENT IN AN ORGANIZED HEALTH CARE EDUCATION/TRAINING PROGRAM

## 2023-02-06 PROCEDURE — 99214 OFFICE O/P EST MOD 30 MIN: CPT | Performed by: STUDENT IN AN ORGANIZED HEALTH CARE EDUCATION/TRAINING PROGRAM

## 2023-02-06 RX ORDER — AZELASTINE 1 MG/ML
2 SPRAY, METERED NASAL NIGHTLY PRN
Qty: 30 ML | Refills: 0 | Status: SHIPPED | OUTPATIENT
Start: 2023-02-06

## 2023-02-06 RX ORDER — FLUTICASONE PROPIONATE 50 MCG
2 SPRAY, SUSPENSION (ML) NASAL DAILY PRN
Qty: 16 G | Refills: 0 | Status: SHIPPED | OUTPATIENT
Start: 2023-02-06

## 2023-02-06 NOTE — PATIENT INSTRUCTIONS
Blood Pressure Cuff Brand: OMRON 3-Series Arm Cuff  Take your blood pressure once in the morning and once in the evening for at least 7 days. If your average blood pressure is under 120/80 then this is good. If however your average blood pressure is at 130/80 or above you may require further adjustment of your blood pressure medications.

## 2023-02-13 ENCOUNTER — HOSPITAL ENCOUNTER (OUTPATIENT)
Dept: MAMMOGRAPHY | Age: 53
Discharge: HOME | End: 2023-02-13
Attending: STUDENT IN AN ORGANIZED HEALTH CARE EDUCATION/TRAINING PROGRAM
Payer: COMMERCIAL

## 2023-02-13 DIAGNOSIS — Z12.31 ENCOUNTER FOR SCREENING MAMMOGRAM FOR MALIGNANT NEOPLASM OF BREAST: ICD-10-CM

## 2023-02-13 PROCEDURE — 77067 SCR MAMMO BI INCL CAD: CPT | Performed by: STUDENT IN AN ORGANIZED HEALTH CARE EDUCATION/TRAINING PROGRAM

## 2023-02-13 PROCEDURE — 77063 BREAST TOMOSYNTHESIS BI: CPT | Performed by: STUDENT IN AN ORGANIZED HEALTH CARE EDUCATION/TRAINING PROGRAM

## 2023-02-21 ENCOUNTER — TELEPHONE (OUTPATIENT)
Facility: CLINIC | Age: 53
End: 2023-02-21

## 2023-02-21 DIAGNOSIS — K62.5 BRBPR (BRIGHT RED BLOOD PER RECTUM): Primary | ICD-10-CM

## 2023-02-21 NOTE — TELEPHONE ENCOUNTER
It's fine to review below with her. Just make sure she is not having rectal pain, abd pain, diarrhea, melena, sob, dizziness, fatigue. She will need a follow-up visit if she is having on-going symptoms. I placed the order for the cbc. Thanks!   Shasha Mak

## 2023-02-21 NOTE — TELEPHONE ENCOUNTER
Jacob Garrido (office on call for Dr. Jignesh Renner). Please advise below. Thank you. Called patient,  verified. Patient complaints: 1st episode this am: bright red blood in tissue when wiping after having bm, 2nd episode: noted blood with BM in the toilet, unable to quantify amount, denies dripping of blood/clots, bm was soft, brick brown color. Severity: mild  Frequency: 2 episodes this morning as above  Onset:started  last week 2 episodes of blood in tissue when wiping after bm  Associated symptoms: nausea, mid abdominal discomfort, 3-4/10, fatigue. Pt is still working unsure if fatigue is r/t her work. + constipation, taking dulcolax 1 tab daily for constipation. Denies: fever, vomiting, dizziness  Denies taking nonsteroidal anti-inflammatory drugs on a regular basis. Any blood thinners? N  Any food triggers? N  Any history of gastrointestinal bleeding:N    Instructed pt that if condition worsens, has severe bleeding or with blood clots on and off, feeling dizzy, please go to the nearest ER. Patient verbalized understanding.

## 2023-02-21 NOTE — TELEPHONE ENCOUNTER
Leandro Jimenez,    If pt calls back, is it ok for me to relay your recommendations below or do you want to talk to pt yourself? Please advise. Thank you.

## 2023-02-21 NOTE — TELEPHONE ENCOUNTER
Pt states that she has been having  blood in her stool for the last week. Today there was a lot of blood in the toilet. Call transferred to RN.

## 2023-02-21 NOTE — TELEPHONE ENCOUNTER
Called patient,  verified. Pt reported having rectal pain/discomfort, 5/10 rating, she is lying on her side. She also has diarrhea with large amount if blood and some clots. She has heartburn and took tums. She was nauseated and took Zofran. Given above symptoms kulwant large amount of blood + clots with diarrhea, pt was instructed to go to ER. Pt verbalized understanding, agrees to go,  \"I sure can\".

## 2023-03-17 ENCOUNTER — LAB ENCOUNTER (OUTPATIENT)
Dept: LAB | Age: 53
End: 2023-03-17
Attending: INTERNAL MEDICINE
Payer: COMMERCIAL

## 2023-03-17 DIAGNOSIS — Z01.818 PRE-OP TESTING: ICD-10-CM

## 2023-03-18 LAB — SARS-COV-2 RNA RESP QL NAA+PROBE: NOT DETECTED

## 2023-03-20 ENCOUNTER — ANESTHESIA (OUTPATIENT)
Dept: ENDOSCOPY | Facility: HOSPITAL | Age: 53
End: 2023-03-20
Payer: COMMERCIAL

## 2023-03-20 ENCOUNTER — ANESTHESIA EVENT (OUTPATIENT)
Dept: ENDOSCOPY | Facility: HOSPITAL | Age: 53
End: 2023-03-20
Payer: COMMERCIAL

## 2023-03-20 ENCOUNTER — HOSPITAL ENCOUNTER (OUTPATIENT)
Facility: HOSPITAL | Age: 53
Setting detail: HOSPITAL OUTPATIENT SURGERY
Discharge: HOME | End: 2023-03-20
Attending: INTERNAL MEDICINE | Admitting: INTERNAL MEDICINE
Payer: COMMERCIAL

## 2023-03-20 VITALS
HEART RATE: 67 BPM | OXYGEN SATURATION: 100 % | WEIGHT: 180 LBS | DIASTOLIC BLOOD PRESSURE: 81 MMHG | HEIGHT: 64 IN | RESPIRATION RATE: 12 BRPM | BODY MASS INDEX: 30.73 KG/M2 | SYSTOLIC BLOOD PRESSURE: 120 MMHG

## 2023-03-20 DIAGNOSIS — L90.5 SCARRING: ICD-10-CM

## 2023-03-20 DIAGNOSIS — R93.89 ABNORMAL CT SCAN: ICD-10-CM

## 2023-03-20 DIAGNOSIS — K20.90 ESOPHAGITIS: ICD-10-CM

## 2023-03-20 DIAGNOSIS — K31.89 RETAINED FOOD IN STOMACH: ICD-10-CM

## 2023-03-20 DIAGNOSIS — K44.9 HIATAL HERNIA: ICD-10-CM

## 2023-03-20 DIAGNOSIS — K22.2 ESOPHAGEAL RING: ICD-10-CM

## 2023-03-20 DIAGNOSIS — Z01.818 PRE-OP TESTING: Primary | ICD-10-CM

## 2023-03-20 PROCEDURE — 0DB28ZX EXCISION OF MIDDLE ESOPHAGUS, VIA NATURAL OR ARTIFICIAL OPENING ENDOSCOPIC, DIAGNOSTIC: ICD-10-PCS | Performed by: INTERNAL MEDICINE

## 2023-03-20 PROCEDURE — 88305 TISSUE EXAM BY PATHOLOGIST: CPT | Performed by: INTERNAL MEDICINE

## 2023-03-20 PROCEDURE — 0DB38ZX EXCISION OF LOWER ESOPHAGUS, VIA NATURAL OR ARTIFICIAL OPENING ENDOSCOPIC, DIAGNOSTIC: ICD-10-PCS | Performed by: INTERNAL MEDICINE

## 2023-03-20 PROCEDURE — 88312 SPECIAL STAINS GROUP 1: CPT | Performed by: INTERNAL MEDICINE

## 2023-03-20 RX ORDER — ONDANSETRON 2 MG/ML
4 INJECTION INTRAMUSCULAR; INTRAVENOUS ONCE AS NEEDED
Status: DISCONTINUED | OUTPATIENT
Start: 2023-03-20 | End: 2023-03-20

## 2023-03-20 RX ORDER — NALOXONE HYDROCHLORIDE 0.4 MG/ML
80 INJECTION, SOLUTION INTRAMUSCULAR; INTRAVENOUS; SUBCUTANEOUS AS NEEDED
Status: DISCONTINUED | OUTPATIENT
Start: 2023-03-20 | End: 2023-03-20

## 2023-03-20 RX ORDER — PANTOPRAZOLE SODIUM 40 MG/1
40 TABLET, DELAYED RELEASE ORAL
Qty: 90 TABLET | Refills: 3 | Status: SHIPPED | OUTPATIENT
Start: 2023-03-20 | End: 2024-03-14

## 2023-03-20 RX ORDER — SODIUM CHLORIDE, SODIUM LACTATE, POTASSIUM CHLORIDE, CALCIUM CHLORIDE 600; 310; 30; 20 MG/100ML; MG/100ML; MG/100ML; MG/100ML
INJECTION, SOLUTION INTRAVENOUS CONTINUOUS
Status: DISCONTINUED | OUTPATIENT
Start: 2023-03-20 | End: 2023-03-20

## 2023-03-20 RX ORDER — LIDOCAINE HYDROCHLORIDE 10 MG/ML
INJECTION, SOLUTION EPIDURAL; INFILTRATION; INTRACAUDAL; PERINEURAL AS NEEDED
Status: DISCONTINUED | OUTPATIENT
Start: 2023-03-20 | End: 2023-03-20 | Stop reason: SURG

## 2023-03-20 RX ADMIN — LIDOCAINE HYDROCHLORIDE 50 MG: 10 INJECTION, SOLUTION EPIDURAL; INFILTRATION; INTRACAUDAL; PERINEURAL at 07:37:00

## 2023-03-20 RX ADMIN — SODIUM CHLORIDE, SODIUM LACTATE, POTASSIUM CHLORIDE, CALCIUM CHLORIDE: 600; 310; 30; 20 INJECTION, SOLUTION INTRAVENOUS at 07:35:00

## 2023-03-20 NOTE — ANESTHESIA POSTPROCEDURE EVALUATION
Patient: Denise Solomon    Procedure Summary     Date: 03/20/23 Room / Location: [address] [place] [address] ENDOSCOPY 04 / [facility] ENDOSCOPY    Anesthesia Start: 8171 Anesthesia Stop:     Procedure: ESOPHAGOGASTRODUODENOSCOPY Diagnosis:       Abnormal CT scan      (food in stomach, wide open esophageal ring, Hiatal Hernia, Esophageal scarring)    Surgeons: Romayne Bloomer, MD Anesthesiologist: Shelley Garcia CRNA    Anesthesia Type: MAC, general ASA Status: 3          Anesthesia Type: No value filed. Vitals Value Taken Time   /62 03/20/23 0751   Temp  03/20/23 0752   Pulse 93 03/20/23 0751   Resp 16 03/20/23 0751   SpO2 100 % 03/20/23 0751       [facility] AN Post Evaluation:   Patient Evaluated in Patient location: [facility] 21. Patient Participation: complete - patient participated  Level of Consciousness: awake  Pain Score: 0  Pain Management: adequate  Airway Patency:patent  Dental exam unchanged from preop  Yes    Cardiovascular Status: stable  Respiratory Status: room air  Postoperative Hydration stable      Iris Holstein.  Alice Kim CRNA  3/20/2023 7:52 AM

## 2023-03-20 NOTE — OPERATIVE REPORT
ESOPHAGOGASTRODUODENOSCOPY (EGD) REPORT    [facility]     1970 Age 46year old   PCP Sharee Spencer MD Endoscopist Abel Orta MD       Date of procedure: 23    Procedure: EGD w/biopsies    Pre-operative diagnosis: acid reflux    Post-operative diagnosis: food in the stomach, esophogeal scarring, esophagitis, hiatal hernia    Medications: MAC sedation    Complications: none    Procedure:  Informed consent was obtained from the patient after the risks of the procedure were discussed, including but not limited to bleeding, perforation, aspiration, infection, or possibility of a missed lesion. After discussions of the risks/benefits and alternatives to this procedure, as well as the planned sedation, the patient was placed in the left lateral decubitus position and begun on continuous blood pressure pulse oximetry and EKG monitoring and this was maintained throughout the procedure. Once an adequate level of sedation was obtained a bite block was placed. Then the lubricated tip of the [id number] diagnostic video upper endoscope was inserted and advanced using direct visualization into the posterior pharynx and ultimately into the esophagus. Complications: None    Findings:      1. Esophagus: The squamocolumnar junction was noted at 34 cm and appeared irregular with a wide open ring. The GE junction was noted at 34 cm from the incisors. Hiatus was at 36-37 cm so there was a hiatal hernia. The esophageal mucosa appeared scarred so mid and distal esohpageal biopsies taken to ruleout EOE. 2. Stomach: The stomach distended normally. Normal rugal folds were seen but food in the stomach so did not extensively examine given risk of aspiration. The pylorus was patent. Retroflexion revealed a normal fundus and a normal cardia. 3. Duodenum: The duodenal mucosa appeared normal in the 1st and 2nd portion of the duodenum. Impression:  1.  Scarring still present and given up to proximal esophagus likely EoE    Recommend:  1. Here are some reflux precautions:   - Avoid trigger foods  - Anti-reflux measures: raising the head of the bed at night, avoiding tight clothing or belts, avoiding eating late at night and not lying down shortly after mealtime and achieving weight loss   - Avoid NSAID's, caffeine, peppermints, alcohol, tobacco and foods that incite symptoms   2. Pending biopsies will consider starting fluticasone     >>>If tissue was sampled/removed and you have not received your pathology results either by phone or letter within 2 weeks, please call our office at [phone].     Specimens:  Mid and distal esophogeal biopsies

## 2023-03-20 NOTE — DISCHARGE INSTRUCTIONS
Home Care Instructions for Gastroscopy with Sedation    Diet:  - Resume your regular diet as tolerated unless otherwise instructed. - Start with light meals to minimize bloating.  - Do not drink alcohol today. Medication:  - If you have questions about resuming your normal medications, please contact your Primary Care Physician. Activities:  - Take it easy today. Do not return to work today. - Do not drive today. - Do not operate any machinery today (including kitchen equipment). Gastroscopy:  - You may have a sore throat for 2-3 days following the exam. This is normal. Gargling with warm salt water (1/2 tsp salt to 1 glass warm water) or using throat lozenges will help. - If you experience any sharp pain in your neck, abdomen or chest, vomiting of blood, oral temperature over 100 degrees Fahrenheit, light-headedness or dizziness, or any other problems, contact your doctor. **If unable to reach your doctor, please go to the [facility] AND [facility] Emergency Room**    - Your referring physician will receive a full report of your examination.  - If you do not hear from your doctor's office within two weeks of your biopsy, please call them for your results. You may be able to see your laboratory results in [employer] between 4 and 7 business days. In some cases, your physician may not have viewed the results before they are released to [address] If you have questions regarding your results contact the physician who ordered the test/exam by phone or via [address] by choosing \"Ask a Medical Question. \"

## 2023-03-21 ENCOUNTER — PATIENT MESSAGE (OUTPATIENT)
Facility: CLINIC | Age: 53
End: 2023-03-21

## 2023-03-21 NOTE — TELEPHONE ENCOUNTER
From: Denise Solomon  To: Ramesh Castle MD  Sent: 3/21/2023 9:11 AM CDT  Subject: Question regarding SURGICAL PATHOLOGY TISSUE    Could someone please explain results and what is the next step if any

## 2023-03-27 ENCOUNTER — TELEPHONE (OUTPATIENT)
Facility: CLINIC | Age: 53
End: 2023-03-27

## 2023-03-27 DIAGNOSIS — K20.0 EOSINOPHILIC ESOPHAGITIS: Primary | ICD-10-CM

## 2023-03-27 RX ORDER — FLUTICASONE PROPIONATE 220 UG/1
AEROSOL, METERED RESPIRATORY (INHALATION)
Qty: 1 EACH | Refills: 1 | Status: SHIPPED | OUTPATIENT
Start: 2023-03-27

## 2023-03-27 NOTE — TELEPHONE ENCOUNTER
apn contacted pt. She reports mild discomfort following procedure and has tried house's with some improvement. She denies fever/chills, ha, stomach ache, nasal discharge. Has dysphagia with solids, which preceded egd. No vomiting, melena. PATH from egd 3/2023 c/w up to 20-30 eos per [facility]PF in sample from distal esophagus and 30-40 per HFP in mid esophagus. EGD path 9/2022 reviewed  eos count in distal esophagus 25 per HPF     She has been on pantoprazole once daily. Advised soft diet, continue supportive measures for sore throat, contact office or pcp if on-going symptoms. Continue pantoprazole   Start fluticasone x 4-6 weeks-rx sent escribe    Referred to allergy and advised f/u with MD to consider repeat egd. She verbalizes understanding and is in agreement with the plan.

## 2023-03-27 NOTE — TELEPHONE ENCOUNTER
Franciso Aschoff, (office on call for Dr. Emanuel Oseguera)    Please advise on pt's 2nd request on EGD results. She also still c/o sore throat, is this normal?    EGD 3/20/23 by Dr. Emanuel Oseguera    I spoke with [facility], name/ verified. Thank you. Pt said she has sore throat and had been chewing halls, eating small portions of soft diet, denies fever, nausea, vomiting, able to tolerate liquids. Discussed education below:  -Warm salt gargle (1/2 tsp salt to a glass of warm water) or throat lozenges as needed. -If sharp pain in neck, chest, abdomen, vomiting blood, oral temp over 100 F, light headed, dizzy, contact MD or go to ER.

## 2023-03-28 NOTE — TELEPHONE ENCOUNTER
Called today and reviewed results again.  Melodie Sharp NP also reviewed with patient yesterday    Will close this encounter

## 2023-03-30 ENCOUNTER — TELEPHONE (OUTPATIENT)
Facility: CLINIC | Age: 53
End: 2023-03-30

## 2023-03-30 DIAGNOSIS — K20.0 EOSINOPHILIC ESOPHAGITIS: Primary | ICD-10-CM

## 2023-03-30 NOTE — TELEPHONE ENCOUNTER
GI Schedulers,    Please assist pt with scheduling EGD. (also please see telephone encounter 3/27)    Thank you.

## 2023-03-31 NOTE — TELEPHONE ENCOUNTER
Scheduled for:  EGD w/Possible Dilation  Provider Name: Dr. Renee Billingsley   Date:  7/10/23  Location: [facility]   Sedation: MAC  Time: 7:30AM (pt is aware to arrive at 6:30AM)     Prep:  NPO  Meds/Allergies Reconciled?:  Physician reviewed      Diagnosis with codes:  EOE K20.0  Was patient informed to call insurance with codes (Y/N):  Yes, I confirmed [facility] insurance with this patient. Referral sent?:  Yes, Referral was sent at the time of electronic surgical scheduling. [facility] or [address] notified?: Yes, I sent an electronic request to Endo Scheduling and received a confirmation today. Medication Orders:  N/A  Misc Orders:  Patient was informed that they will need a COVID 19 test prior to their procedure. Patient verbally understood & will await a phone call from [facility] to schedule. Further instructions given by staff: I discussed the prep instructions with the patient which she verbally understood and is aware that I will send the instructions today via [address]

## 2023-04-11 ENCOUNTER — TELEPHONE (OUTPATIENT)
Facility: CLINIC | Age: 53
End: 2023-04-11

## 2023-05-01 LAB
ABSOLUTE BASOPHILS: 20 CELLS/UL (ref 0–200)
ABSOLUTE EOSINOPHILS: 127 CELLS/UL (ref 15–500)
ABSOLUTE LYMPHOCYTES: 2593 CELLS/UL (ref 850–3900)
ABSOLUTE MONOCYTES: 429 CELLS/UL (ref 200–950)
ABSOLUTE NEUTROPHILS: 3531 CELLS/UL (ref 1500–7800)
BASOPHILS: 0.3 %
EOSINOPHILS: 1.9 %
HEMATOCRIT: 39.3 % (ref 35–45)
HEMOGLOBIN: 13 G/DL (ref 11.7–15.5)
LYMPHOCYTES: 38.7 %
MCH: 27 PG (ref 27–33)
MCHC: 33.1 G/DL (ref 32–36)
MCV: 81.5 FL (ref 80–100)
MONOCYTES: 6.4 %
MPV: 9.7 FL (ref 7.5–12.5)
NEUTROPHILS: 52.7 %
PLATELET COUNT: 330 THOUSAND/UL (ref 140–400)
RDW: 12.9 % (ref 11–15)
RED BLOOD CELL COUNT: 4.82 MILLION/UL (ref 3.8–5.1)
WHITE BLOOD CELL COUNT: 6.7 THOUSAND/UL (ref 3.8–10.8)

## 2023-07-10 ENCOUNTER — ANESTHESIA (OUTPATIENT)
Dept: ENDOSCOPY | Facility: HOSPITAL | Age: 53
End: 2023-07-10
Payer: COMMERCIAL

## 2023-07-10 ENCOUNTER — MED REC SCAN ONLY (OUTPATIENT)
Facility: CLINIC | Age: 53
End: 2023-07-10

## 2023-07-10 ENCOUNTER — HOSPITAL ENCOUNTER (OUTPATIENT)
Facility: HOSPITAL | Age: 53
Setting detail: HOSPITAL OUTPATIENT SURGERY
Discharge: HOME | End: 2023-07-10
Attending: INTERNAL MEDICINE | Admitting: INTERNAL MEDICINE
Payer: COMMERCIAL

## 2023-07-10 ENCOUNTER — ANESTHESIA EVENT (OUTPATIENT)
Dept: ENDOSCOPY | Facility: HOSPITAL | Age: 53
End: 2023-07-10
Payer: COMMERCIAL

## 2023-07-10 VITALS
HEIGHT: 64 IN | HEART RATE: 64 BPM | BODY MASS INDEX: 30.73 KG/M2 | OXYGEN SATURATION: 95 % | SYSTOLIC BLOOD PRESSURE: 109 MMHG | RESPIRATION RATE: 16 BRPM | WEIGHT: 180 LBS | TEMPERATURE: 98 F | DIASTOLIC BLOOD PRESSURE: 75 MMHG

## 2023-07-10 DIAGNOSIS — K20.0 EOSINOPHILIC ESOPHAGITIS: ICD-10-CM

## 2023-07-10 PROCEDURE — 0DB28ZX EXCISION OF MIDDLE ESOPHAGUS, VIA NATURAL OR ARTIFICIAL OPENING ENDOSCOPIC, DIAGNOSTIC: ICD-10-PCS | Performed by: INTERNAL MEDICINE

## 2023-07-10 PROCEDURE — 43239 EGD BIOPSY SINGLE/MULTIPLE: CPT | Performed by: INTERNAL MEDICINE

## 2023-07-10 PROCEDURE — 0DB38ZX EXCISION OF LOWER ESOPHAGUS, VIA NATURAL OR ARTIFICIAL OPENING ENDOSCOPIC, DIAGNOSTIC: ICD-10-PCS | Performed by: INTERNAL MEDICINE

## 2023-07-10 RX ORDER — LIDOCAINE HYDROCHLORIDE 10 MG/ML
INJECTION, SOLUTION EPIDURAL; INFILTRATION; INTRACAUDAL; PERINEURAL AS NEEDED
Status: DISCONTINUED | OUTPATIENT
Start: 2023-07-10 | End: 2023-07-10 | Stop reason: SURG

## 2023-07-10 RX ORDER — NALOXONE HYDROCHLORIDE 0.4 MG/ML
80 INJECTION, SOLUTION INTRAMUSCULAR; INTRAVENOUS; SUBCUTANEOUS AS NEEDED
Status: DISCONTINUED | OUTPATIENT
Start: 2023-07-10 | End: 2023-07-10

## 2023-07-10 RX ORDER — SODIUM CHLORIDE, SODIUM LACTATE, POTASSIUM CHLORIDE, CALCIUM CHLORIDE 600; 310; 30; 20 MG/100ML; MG/100ML; MG/100ML; MG/100ML
INJECTION, SOLUTION INTRAVENOUS CONTINUOUS PRN
Status: DISCONTINUED | OUTPATIENT
Start: 2023-07-10 | End: 2023-07-10 | Stop reason: SURG

## 2023-07-10 RX ORDER — SODIUM CHLORIDE 0.9 % (FLUSH) 0.9 %
10 SYRINGE (ML) INJECTION AS NEEDED
Status: DISCONTINUED | OUTPATIENT
Start: 2023-07-10 | End: 2023-07-10

## 2023-07-10 RX ORDER — SODIUM CHLORIDE, SODIUM LACTATE, POTASSIUM CHLORIDE, CALCIUM CHLORIDE 600; 310; 30; 20 MG/100ML; MG/100ML; MG/100ML; MG/100ML
INJECTION, SOLUTION INTRAVENOUS CONTINUOUS
Status: DISCONTINUED | OUTPATIENT
Start: 2023-07-10 | End: 2023-07-10

## 2023-07-10 RX ADMIN — SODIUM CHLORIDE, SODIUM LACTATE, POTASSIUM CHLORIDE, CALCIUM CHLORIDE: 600; 310; 30; 20 INJECTION, SOLUTION INTRAVENOUS at 07:31:00

## 2023-07-10 RX ADMIN — SODIUM CHLORIDE, SODIUM LACTATE, POTASSIUM CHLORIDE, CALCIUM CHLORIDE: 600; 310; 30; 20 INJECTION, SOLUTION INTRAVENOUS at 07:45:00

## 2023-07-10 RX ADMIN — LIDOCAINE HYDROCHLORIDE 50 MG: 10 INJECTION, SOLUTION EPIDURAL; INFILTRATION; INTRACAUDAL; PERINEURAL at 07:33:00

## 2023-07-10 NOTE — DISCHARGE INSTRUCTIONS
Home Care Instructions for Gastroscopy with Sedation    Diet:  - Resume your regular diet as tolerated unless otherwise instructed. - Start with light meals to minimize bloating.  - Do not drink alcohol today. Medication:  - If you have questions about resuming your normal medications, please contact your Primary Care Physician. Activities:  - Take it easy today. Do not return to work today. - Do not drive today. - Do not operate any machinery today (including kitchen equipment). Gastroscopy:  - You may have a sore throat for 2-3 days following the exam. This is normal. Gargling with warm salt water (1/2 tsp salt to 1 glass warm water) or using throat lozenges will help. - If you experience any sharp pain in your neck, abdomen or chest, vomiting of blood, oral temperature over 100 degrees Fahrenheit, light-headedness or dizziness, or any other problems, contact your doctor. **If unable to reach your doctor, please go to the [facility] Emergency Room**    - Your referring physician will receive a full report of your examination.  - If you do not hear from your doctor's office within two weeks of your biopsy, please call them for your results. You may be able to see your laboratory results in [facility] between 4 and 7 business days. In some cases, your physician may not have viewed the results before they are released to [address] If you have questions regarding your results contact the physician who ordered the test/exam by phone or via [address] by choosing \"Ask a Medical Question. \"

## 2023-07-10 NOTE — OPERATIVE REPORT
ESOPHAGOSCOPY REPORT    Samantha Shelby     1970 Age 46year old   PCP Ainsley Lombard, MD Endoscopist Damian Ingram MD       Date of procedure: 07/10/23    Procedure: esophagoscopy w/biopsies    Pre-operative diagnosis: acid reflux and eosinophilic esophagitis    Post-operative diagnosis: esophageal scarring, healed esophagitis, hiatal hernia    Medications: MAC sedation    Complications: none    Procedure:  Informed consent was obtained from the patient after the risks of the procedure were discussed, including but not limited to bleeding, perforation, aspiration, infection, or possibility of a missed lesion. After discussions of the risks/benefits and alternatives to this procedure, as well as the planned sedation, the patient was placed in the left lateral decubitus position and begun on continuous blood pressure pulse oximetry and EKG monitoring and this was maintained throughout the procedure. Once an adequate level of sedation was obtained a bite block was placed. Then the lubricated tip of the [id number] diagnostic video upper endoscope was inserted and advanced using direct visualization into the posterior pharynx and ultimately into the esophagus. Complications: None    Findings:      1. Esophagus: The squamocolumnar junction was noted at 34 cm and appeared irregular with a wide open ring. The GE junction was noted at 34 cm from the incisors. Hiatus was at 36-37 cm so there was a hiatal hernia. The esophageal mucosa appeared scarred so mid and distal esohpageal biopsies taken to ruleout EOE. 2. Stomach: Limited exam due to indication of procedure. The stomach distended normally. Normal rugal folds were seen. The pylorus was patent. Retroflexion revealed a normal fundus and a normal cardia. 3. Duodenum: not examined    Impression:  1. Scarring still present but improved    Recommend:  1.  Here are some reflux precautions:   - Avoid trigger foods  - Anti-reflux measures: raising the head of the bed at night, avoiding tight clothing or belts, avoiding eating late at night and not lying down shortly after mealtime and achieving weight loss   - Avoid NSAID's, caffeine, peppermints, alcohol, tobacco and foods that incite symptoms   2. Pending biopsies discussed continuing fluticasone    >>>If tissue was sampled/removed and you have not received your pathology results either by phone or letter within 2 weeks, please call our office at [phone].     Specimens:  Mid and distal esophogeal biopsies

## 2023-07-11 ENCOUNTER — PATIENT MESSAGE (OUTPATIENT)
Dept: FAMILY MEDICINE CLINIC | Facility: CLINIC | Age: 53
End: 2023-07-11

## 2023-07-11 ENCOUNTER — PATIENT MESSAGE (OUTPATIENT)
Dept: GASTROENTEROLOGY | Facility: CLINIC | Age: 53
End: 2023-07-11

## 2023-07-11 NOTE — TELEPHONE ENCOUNTER
From: Farrah Cano  To:  Joe Bender MD  Sent: 7/11/2023 8:42 AM CDT  Subject: Referrals    Wanted to be sure my referral for the Gastroenterologist and Nephrologist are up to date as I have appointments in near future

## 2023-07-11 NOTE — TELEPHONE ENCOUNTER
From: Casey Capellan  To: Yelena Baumann MD  Sent: 7/11/2023 8:31 AM CDT  Subject: Connective Tissue Disease (YONG) Screen    Can you tell me did I do this screening, if not what actually is it for and where/how do I schedule this?

## 2023-07-11 NOTE — TELEPHONE ENCOUNTER
Mychart message to pt re purpose of jan blood test and that GI and Nephrology referrals are active. See other mychart note.

## 2023-07-12 ENCOUNTER — TELEPHONE (OUTPATIENT)
Facility: CLINIC | Age: 53
End: 2023-07-12

## 2023-07-13 ENCOUNTER — TELEPHONE (OUTPATIENT)
Facility: CLINIC | Age: 53
End: 2023-07-13

## 2023-07-13 NOTE — TELEPHONE ENCOUNTER
Pharmacist calling to clarify how many times a day you want the patient to take Budesonide inhalation suspension. Please advise. Thank you.

## 2023-07-13 NOTE — TELEPHONE ENCOUNTER
----- Message from Washington Jiménez MD sent at 7/11/2023 10:53 AM CDT -----  Patient with worsening eosinophil count  Not on regular swallowed steroids but PPI regularly  Needs to do PPI regular and swallowed steroid and possibly consider seeing specialist  Has appointment in office so can discuss further

## 2023-07-13 NOTE — TELEPHONE ENCOUNTER
Dr. Tre Desir,   I called and spoke to pt. She is currently taking only the pantoprazole daily right now. It sounds like you want her to take the swallowed budesonide and pantoprazole per message. Pt wanted to verify that she should no longer use the fluticasone inhaler. Pt never did the swallowed steroid before. Please verify if pt should just be on the two meds; ppi and swallowed budesonide.   Thanks,  Edson Knapp

## 2023-07-13 NOTE — TELEPHONE ENCOUNTER
Patient calling regards medication, pharmacy states not covered by insurance, per pharmacy exceeds ml's, pt states too high cost if there is another alternative.  Budesonide 1 MG/2ML Inhalation Suspension

## 2023-07-17 ENCOUNTER — OFFICE VISIT (OUTPATIENT)
Dept: FAMILY MEDICINE CLINIC | Facility: CLINIC | Age: 53
End: 2023-07-17
Payer: COMMERCIAL

## 2023-07-17 VITALS
OXYGEN SATURATION: 98 % | RESPIRATION RATE: 18 BRPM | BODY MASS INDEX: 30.9 KG/M2 | HEIGHT: 64 IN | HEART RATE: 86 BPM | SYSTOLIC BLOOD PRESSURE: 110 MMHG | WEIGHT: 181 LBS | DIASTOLIC BLOOD PRESSURE: 80 MMHG

## 2023-07-17 DIAGNOSIS — Z00.00 LABORATORY EXAMINATION ORDERED AS PART OF A ROUTINE GENERAL MEDICAL EXAMINATION: ICD-10-CM

## 2023-07-17 DIAGNOSIS — Z00.00 ANNUAL PHYSICAL EXAM: Primary | ICD-10-CM

## 2023-07-17 DIAGNOSIS — H91.91 HEARING LOSS OF RIGHT EAR, UNSPECIFIED HEARING LOSS TYPE: ICD-10-CM

## 2023-07-17 PROCEDURE — 3074F SYST BP LT 130 MM HG: CPT | Performed by: STUDENT IN AN ORGANIZED HEALTH CARE EDUCATION/TRAINING PROGRAM

## 2023-07-17 PROCEDURE — 3008F BODY MASS INDEX DOCD: CPT | Performed by: STUDENT IN AN ORGANIZED HEALTH CARE EDUCATION/TRAINING PROGRAM

## 2023-07-17 PROCEDURE — 99396 PREV VISIT EST AGE 40-64: CPT | Performed by: STUDENT IN AN ORGANIZED HEALTH CARE EDUCATION/TRAINING PROGRAM

## 2023-07-17 PROCEDURE — 3079F DIAST BP 80-89 MM HG: CPT | Performed by: STUDENT IN AN ORGANIZED HEALTH CARE EDUCATION/TRAINING PROGRAM

## 2023-07-17 NOTE — TELEPHONE ENCOUNTER
RN called and spoke to pt. Informed her that budesonide is available at [facility] and will cost $110 for a 90 day supply. RN discussed how to take med once daily. Pt verbalized understanding.

## 2023-07-18 LAB
CHOL/HDLC RATIO: 3.1 (CALC)
CHOLESTEROL, TOTAL: 191 MG/DL
HDL CHOLESTEROL: 62 MG/DL
HEMOGLOBIN A1C: 5.9 % OF TOTAL HGB
LDL-CHOLESTEROL: 115 MG/DL (CALC)
NON-HDL CHOLESTEROL: 129 MG/DL (CALC)
TRIGLYCERIDES: 53 MG/DL
TSH W/REFLEX TO FT4: 0.47 MIU/L

## 2023-07-26 ENCOUNTER — TELEPHONE (OUTPATIENT)
Dept: FAMILY MEDICINE CLINIC | Facility: CLINIC | Age: 53
End: 2023-07-26

## 2023-07-26 DIAGNOSIS — H91.91 HEARING LOSS OF RIGHT EAR, UNSPECIFIED HEARING LOSS TYPE: Primary | ICD-10-CM

## 2023-07-26 NOTE — TELEPHONE ENCOUNTER
Pt called back, advised Dr. Alvarez Hernandez is out of network. Pt thinks Dr. Kohler Amen office scheduled her with Dr. Edy Beck who is at same office.   Advised her to call insurance and get name of audiologist in network and call us back so we can do a referral.

## 2023-07-26 NOTE — TELEPHONE ENCOUNTER
Lorrie Araiza Emg [facility] Office  Good Afternoon,    [employer], Pablo Stephens, is out of network with the patient's [facility] [employer] [place] Insurance plan. I am unable to process this request at this time. Please update referral with an in network Rendering Provider and I will be happy to reprocess this referral request.  Please advise the patient to contact [facility] to obtain the name of an [facility] Provider. Thank you,  Jordi BRUSH asking pt to call back.   Has appts with other audiologist but do not see referral.

## 2023-07-26 NOTE — TELEPHONE ENCOUNTER
Pt requests referral to Dr Adrien Sheffield for hearing loss, because he is INN with health plan. Referral placed.

## 2023-07-28 ENCOUNTER — PATIENT MESSAGE (OUTPATIENT)
Dept: FAMILY MEDICINE CLINIC | Facility: CLINIC | Age: 53
End: 2023-07-28

## 2023-07-28 DIAGNOSIS — Z12.83 SCREENING EXAM FOR SKIN CANCER: Primary | ICD-10-CM

## 2023-07-31 NOTE — TELEPHONE ENCOUNTER
From: Yamilka Colon  To:  Cl Hammer MD  Sent: 7/28/2023 5:21 PM CDT  Subject: Dermatologist    I have an appointment scheduled for August 1st for Dr Johnathan Ruiz for my annual skin cancer screening and will need a referral.     Contact # [phone]

## 2023-08-01 DIAGNOSIS — Z94.0 STATUS POST KIDNEY TRANSPLANT: Primary | ICD-10-CM

## 2023-08-04 LAB
ABSOLUTE BASOPHILS: 29 CELLS/UL (ref 0–200)
ABSOLUTE EOSINOPHILS: 131 CELLS/UL (ref 15–500)
ABSOLUTE LYMPHOCYTES: 2993 CELLS/UL (ref 850–3900)
ABSOLUTE MONOCYTES: 431 CELLS/UL (ref 200–950)
ABSOLUTE NEUTROPHILS: 3716 CELLS/UL (ref 1500–7800)
ALBUMIN/GLOBULIN RATIO: 1.5 (CALC) (ref 1–2.5)
ALBUMIN: 4.5 G/DL (ref 3.6–5.1)
ALKALINE PHOSPHATASE: 108 U/L (ref 37–153)
ALT: 13 U/L (ref 6–29)
APPEARANCE: CLEAR
AST: 19 U/L (ref 10–35)
BASOPHILS: 0.4 %
BILIRUBIN, TOTAL: 0.8 MG/DL (ref 0.2–1.2)
BILIRUBIN: NEGATIVE
BUN/CREATININE RATIO: 26 (CALC) (ref 6–22)
BUN: 29 MG/DL (ref 7–25)
CALCIUM: 9.7 MG/DL (ref 8.6–10.4)
CARBON DIOXIDE: 24 MMOL/L (ref 20–32)
CHLORIDE: 108 MMOL/L (ref 98–110)
CHOL/HDLC RATIO: 2.2 (CALC)
CHOLESTEROL, TOTAL: 122 MG/DL
COLOR: YELLOW
CREATININE, RANDOM URINE: 209 MG/DL (ref 20–275)
CREATININE: 1.13 MG/DL (ref 0.5–1.03)
EGFR: 59 ML/MIN/1.73M2
EOSINOPHILS: 1.8 %
GLOBULIN: 3 G/DL (CALC) (ref 1.9–3.7)
GLUCOSE: 96 MG/DL (ref 65–99)
GLUCOSE: NEGATIVE
HDL CHOLESTEROL: 55 MG/DL
HEMATOCRIT: 37.3 % (ref 35–45)
HEMOGLOBIN: 12.4 G/DL (ref 11.7–15.5)
KETONES: NEGATIVE
LDL-CHOLESTEROL: 54 MG/DL (CALC)
LEUKOCYTE ESTERASE: NEGATIVE
LYMPHOCYTES: 41 %
MCH: 27.2 PG (ref 27–33)
MCHC: 33.2 G/DL (ref 32–36)
MCV: 81.8 FL (ref 80–100)
MICROALBUMIN/CREATININE RATIO, RANDOM URINE: 4 MCG/MG CREAT
MICROALBUMIN: 0.8 MG/DL
MONOCYTES: 5.9 %
MPV: 10.4 FL (ref 7.5–12.5)
NEUTROPHILS: 50.9 %
NITRITE: NEGATIVE
NON-HDL CHOLESTEROL: 67 MG/DL (CALC)
OCCULT BLOOD: NEGATIVE
PLATELET COUNT: 277 THOUSAND/UL (ref 140–400)
POTASSIUM: 4.7 MMOL/L (ref 3.5–5.3)
PROTEIN, TOTAL: 7.5 G/DL (ref 6.1–8.1)
PROTEIN: NEGATIVE
RDW: 12.8 % (ref 11–15)
RED BLOOD CELL COUNT: 4.56 MILLION/UL (ref 3.8–5.1)
SODIUM: 141 MMOL/L (ref 135–146)
SPECIFIC GRAVITY: 1.03 (ref 1–1.03)
TACROLIMUS, HIGHLY SENSITIVE, /MS/MS: 7.1 MCG/L
TRIGLYCERIDES: 44 MG/DL
WHITE BLOOD CELL COUNT: 7.3 THOUSAND/UL (ref 3.8–10.8)

## 2023-08-07 ENCOUNTER — OFFICE VISIT (OUTPATIENT)
Dept: NEPHROLOGY | Facility: CLINIC | Age: 53
End: 2023-08-07
Payer: COMMERCIAL

## 2023-08-07 VITALS — WEIGHT: 182.13 LBS | BODY MASS INDEX: 31 KG/M2 | DIASTOLIC BLOOD PRESSURE: 64 MMHG | SYSTOLIC BLOOD PRESSURE: 116 MMHG

## 2023-08-07 DIAGNOSIS — I10 ESSENTIAL HYPERTENSION: Primary | ICD-10-CM

## 2023-08-07 DIAGNOSIS — Z94.0 RENAL TRANSPLANT RECIPIENT: ICD-10-CM

## 2023-08-07 DIAGNOSIS — E78.5 HYPERLIPIDEMIA, UNSPECIFIED HYPERLIPIDEMIA TYPE: ICD-10-CM

## 2023-08-07 PROCEDURE — 99214 OFFICE O/P EST MOD 30 MIN: CPT | Performed by: INTERNAL MEDICINE

## 2023-08-07 PROCEDURE — 3074F SYST BP LT 130 MM HG: CPT | Performed by: INTERNAL MEDICINE

## 2023-08-07 PROCEDURE — 3078F DIAST BP <80 MM HG: CPT | Performed by: INTERNAL MEDICINE

## 2023-08-07 NOTE — PROGRESS NOTES
Nephrology Progress Note      Jerica Hernandez is a 46year old female. HPI:   Patient presents with:  Renal Transplant  Hypertension      Spencer Seip was seen in the nephrology clinic today in follow-up for management of hypertension in the setting of renal transplant. Since her last clinic visit she reports that she has felt relatively well and has not had any recent illnesses or hospitalizations. She does note ongoing issues with constipation and feeling \"full\" and is following with gastroenterology. Remainder of her review of systems is unremarkable      HISTORY:  Past Medical History:   Diagnosis Date    Anesthesia complication     difficult time waking up per patient    Constipation     Always    Extrinsic asthma, unspecified     Pt believes she has grown out of this.     Focal segmental glomerulosclerosis     Heart murmur     High blood pressure     High cholesterol     Iritis, secondary     Kidney transplant recipient       Past Surgical History:   Procedure Laterality Date    COLONOSCOPY      [place]    EGD      [place]    ORGAN TRANSPLANT  01/29/2010    kidney      Family History   Problem Relation Age of Onset    Cancer Father         pancreatic, lung , throat     Cancer Maternal Grandmother     Heart Disease Maternal Grandmother     Stroke Maternal Grandmother     Cancer Maternal Grandfather     Cancer Paternal Grandmother     Breast Cancer Paternal Grandmother     Cancer Paternal Grandfather     Cancer Other     Hypertension Mother     Stroke Mother     Other (Osteoporosis) Mother     Breast Cancer Maternal Cousin Female     Breast Cancer Maternal Cousin Female     Breast Cancer Maternal Cousin Female     Breast Cancer Maternal Cousin Female     Breast Cancer Maternal Cousin Female       Social History:   Social History     Socioeconomic History    Marital status: Single   Tobacco Use    Smoking status: Never     Passive exposure: Never    Smokeless tobacco: Never   Vaping Use    Vaping Use: Never used   Substance and Sexual Activity    Alcohol use: No    Drug use: Never    Sexual activity: Yes     Partners: Male        Medications (Active prior to today's visit):  Current Outpatient Medications   Medication Sig Dispense Refill    CELLCEPT 250 MG Oral Cap Take 3 capsules twice daily 540 capsule 1    tacrolimus 1 MG Oral Cap Take 4mg am and 3mg pm 630 capsule 1    Budesonide 1 MG/2ML Inhalation Suspension Take one container (has 1mg/2mL budesonide) 1. Open the individual medicine container (Budesonide respule) by gently twisting off the top tab. 2.Pour the liquid medicine into a small cup (measuring cup or medicine cup). A Budesonide respule contains 2 ml or less than  teaspoon of medicine. 3.Add apple sauce. Mix the solution until it is a slurry-like consistency for one dose. The goal is to make the solution into a thickened consistency with sufficient volume to coat the esophagus. 4. Swallow all the medicine solution. 5. Do not eat or drink anything for one hour after taking the medicine solution. You may rinse your mouth and spit out the water or brush your teeth after you swallow the medicine, but do not swallow the water. 200 each 0    Fluticasone Propionate  MCG/ACT Inhalation Aerosol Two sprays orally, TWICE a day. Please spray to back of throat and then SWALLOW (do not inhale). Do not eat/drink for 60 min after taking. 1 each 1    pantoprazole 40 MG Oral Tab EC Take 1 tablet (40 mg total) by mouth every morning before breakfast. 90 tablet 3    azelastine 0.1 % Nasal Solution 2 sprays by Nasal route nightly as needed (for sinus congestion). (Patient not taking: Reported on 7/17/2023) 30 mL 0    losartan 25 MG Oral Tab Take 1 tablet (25 mg total) by mouth daily. 90 tablet 3    atorvastatin 20 MG Oral Tab Take 1 tablet (20 mg total) by mouth nightly. 90 tablet 3    acetaminophen 325 MG Oral Tab Take 2 tablets (650 mg total) by mouth every 6 (six) hours as needed. Allergies:    Hydrocodone-Acetami*    OTHER (SEE COMMENTS), ANXIETY,                            NAUSEA AND VOMITING    Comment:[facility]ner Allergy Text Annotation: Vicodin      ROS:     Denies fever/chills  Denies wt loss/gain  Denies HA or visual changes  Denies CP or palpitations  Denies SOB/cough/hemoptysis  Denies abd or flank pain  Denies N/V/D  Denies change in urinary habits or gross hematuria  Denies LE edema  Denies skin rashes/myalgias/arthralgias      PHYSICAL EXAM:   [facility] 01/26/2016   Wt Readings from Last 6 Encounters:  07/17/23 : 181 lb (82.1 kg)  07/10/23 : 180 lb (81.6 kg)  03/20/23 : 180 lb (81.6 kg)  02/06/23 : 178 lb (80.7 kg)  02/01/23 : 185 lb 8 oz (84.1 kg)  11/14/22 : 183 lb (83 kg)      General: Alert and oriented in no apparent distress. HEENT: No scleral icterus, MMM  Neck: Supple, no MARELY or thyromegaly  Cardiac: Regular rate and rhythm, S1, S2 normal, no murmur or rub  Lungs: Clear without wheezes, rales, rhonchi. Extremities: Without clubbing, cyanosis or edema.   Neurologic: Alert and oriented, normal affect, cranial nerves grossly intact, moving all extremities  Skin: Warm and dry, no rashes      LABS:     Lab Results   Component Value Date    BUN 29 (H) 08/03/2023    BUNCREA 26 (H) 08/03/2023    CREATSERUM 1.13 (H) 08/03/2023    GFR 72 03/31/2016    GFRNAA 64 06/27/2022    GFRAA 75 06/27/2022    CA 9.7 08/03/2023    ALKPHO 108 08/03/2023    AST 19 08/03/2023    ALT 13 08/03/2023    BILT 0.8 08/03/2023    TP 7.5 08/03/2023    ALB 4.5 08/03/2023    GLOBULIN 3.0 08/03/2023    AGRATIO 1.5 08/03/2023     08/03/2023    K 4.7 08/03/2023     08/03/2023    CO2 24 08/03/2023     Lab Results   Component Value Date    RBC 4.56 08/03/2023    HGB 12.4 08/03/2023    HCT 37.3 08/03/2023     08/03/2023    MPV 9.0 (L) 02/15/2013    MCV 81.8 08/03/2023    MCH 27.2 08/03/2023    MCHC 33.2 08/03/2023    RDW 12.8 08/03/2023    NEPRELIM 5.09 03/31/2016    NEPERCENT 50.9 08/03/2023 LYPERCENT 41.0 08/03/2023    MOPERCENT 5.9 08/03/2023    EOPERCENT 1.8 08/03/2023    BAPERCENT 0.4 08/03/2023    NE 3,716 08/03/2023    LYMABS 2,993 08/03/2023    MOABSO 431 08/03/2023    EOABSO 131 08/03/2023    BAABSO 29 08/03/2023     Lab Results   Component Value Date    CREUR 103.3 10/04/2014    MICROALBUMIN 0.8 08/03/2023    CREAUR 209 08/03/2023    MALBCREACALC 4 08/03/2023     Lab Results   Component Value Date    CLARITY CLEAR 08/03/2023    SPECGRAVITY 1.032 08/03/2023    GLUUR NEGATIVE 08/03/2023    BILUR NEGATIVE 08/03/2023    KETUR NEGATIVE 08/03/2023    BLOODURINE Moderate (A) 04/01/2016    PHURINE < OR = 5.0 08/03/2023    PROUR NEGATIVE 08/03/2023    UROBILINOGEN <2.0 04/01/2016    NITRITE NEGATIVE 08/03/2023    LEUUR NEGATIVE 08/03/2023    NMIC NOT INDICATED 02/15/2013    WBCUR NONE SEEN 08/03/2023    RBCUR NONE SEEN 08/03/2023    EPIUR 6-10 (A) 08/03/2023    BACUR FEW (A) 08/03/2023    HYLUR NONE SEEN 08/03/2023     ASSESSMENT/PLAN:     #1.  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 She is doing quite well overall with a creatinine at 1.13 mg/dL. Additionally her tacrolimus level is stable at 7.1. She will continue her current immunosuppression consisting of tacrolimus and mycophenolate and we will plan to continue to follow labs every 6 months or so    #2. Hypertension-at her last clinic visit she was significantly hypertensive which had not been the case prior to this. We started losartan 25 mg daily and the blood pressures under much better control    #3. Hyperlipidemia-atorvastatin was started at 20 mg daily due to hyperlipidemia and her repeat labs are significantly better with an LDL of 54      Thank you again for allowing me to participate in the care of your patient. Please do not hesitate to call with any questions or concerns.     Jonatan Giron MD  8/7/2023  9:11 AM

## 2023-08-09 ENCOUNTER — TELEPHONE (OUTPATIENT)
Dept: GASTROENTEROLOGY | Facility: CLINIC | Age: 53
End: 2023-08-09

## 2023-08-09 ENCOUNTER — OFFICE VISIT (OUTPATIENT)
Dept: GASTROENTEROLOGY | Facility: CLINIC | Age: 53
End: 2023-08-09

## 2023-08-09 VITALS
DIASTOLIC BLOOD PRESSURE: 82 MMHG | BODY MASS INDEX: 31.07 KG/M2 | SYSTOLIC BLOOD PRESSURE: 124 MMHG | WEIGHT: 182 LBS | HEIGHT: 64 IN

## 2023-08-09 DIAGNOSIS — K20.0 EOSINOPHILIC ESOPHAGITIS: Primary | ICD-10-CM

## 2023-08-09 DIAGNOSIS — R13.10 DYSPHAGIA, UNSPECIFIED TYPE: ICD-10-CM

## 2023-08-09 PROCEDURE — 3074F SYST BP LT 130 MM HG: CPT | Performed by: INTERNAL MEDICINE

## 2023-08-09 PROCEDURE — 3079F DIAST BP 80-89 MM HG: CPT | Performed by: INTERNAL MEDICINE

## 2023-08-09 PROCEDURE — 3008F BODY MASS INDEX DOCD: CPT | Performed by: INTERNAL MEDICINE

## 2023-08-09 PROCEDURE — 99214 OFFICE O/P EST MOD 30 MIN: CPT | Performed by: INTERNAL MEDICINE

## 2023-08-09 RX ORDER — OMEPRAZOLE 40 MG/1
40 CAPSULE, DELAYED RELEASE ORAL DAILY
Qty: 90 CAPSULE | Refills: 3 | Status: SHIPPED | OUTPATIENT
Start: 2023-08-09

## 2023-08-09 NOTE — PATIENT INSTRUCTIONS
1. EOE and dysphagia  On pantoprazole and budesonide swallowed  Would monitor foods that cause symptoms  Some of the most common causes of food allergies include wheat, eggs, peanut, soy, cow milk and dairy, shellfish. Allergy testing with an allergy specialist is a possibility but sometimes is not all that specific in identifying what could be driving this problem. If you discontinue one or two of these foods for least 6 weeks, the symptoms resume within a few days of resuming the food which is causing this problem - meaning you will feel it when you go back on the food which is causing this. By far, the two most common allergies driving this are diary (cow milk/cream) and/or  wheat/gluten. Recommend:  Change from pantoprazole to omeprazole  Budesonide continue  Repeat EGD in 2 months  Schedule EGD with MAC [Diagnosis: EOE and dysphagia]    Continue all medications for procedure except    ** If MAC @ [facility]/NE:    - NO alcohol, recreational drugs nor erectile dysfunction mediations 24 hours before procedure(s)   - NO herbal supplements or weight loss medications x 7 days prior to the procedure(s)    ** If MAC @ [facility] or IV twilight - continue all medications as prescribed      BOWEL CLEANSE INSTRUCTIONS:  1. Pick a day you will be at home, close to a toilet. 2. Have a some juice for breakfast.   3. Around 10AM start drinking one cup in miralax every 15 minutes over the course of 3-5 hours. IF having nausea/bloating, take a break for 30 minutes, walk around and then resume drinking. If vomiting, take a break for 1 hour, if symptoms improve, and you feel well, can resume drinking. 4. Goal is to finish solution or until stools turn liquid yellow. 5. For lunch you should have a liquid diet (7up, water, gingerale, etc)  6. After prep, for dinner you can have a light dinner. 7. Resume regular meals the following day, along with laxative medications.   8. You should continue your regular medications during the washout day.

## 2023-08-09 NOTE — TELEPHONE ENCOUNTER
Patient has [facility] but scheduled at [address] [place] [address] due to cardiac history and she is a kidney transplant recipient. Scheduled for:  EGD [id number]  Provider Name:  Dr. Oscar Chambers  Date:  10/16/2023  Location:  [facility]  Sedation:  MAC  Time:  3:30 pm, arrival 2:30 pm  Prep:  NPO after midnight  Meds/Allergies Reconciled?:  Physician reviewed  Diagnosis with codes:  EOE K20.0; Dysphagia R13.10  Was patient informed to call insurance with codes (Y/N): Yes    Referral sent?:  Referral was sent at the time of electronic surgical scheduling. [address] [place] [address] or [address] notified?:  I sent an electronic request to Endo Scheduling and received a confirmation today. Medication Orders:  N/A  Misc Orders:  N/A     Further instructions given by staff:  Prep instructions were given to pt in the office, pt verbalized understanding.

## 2023-08-28 ENCOUNTER — IMMUNIZATION (OUTPATIENT)
Dept: LAB | Age: 53
End: 2023-08-28
Attending: EMERGENCY MEDICINE
Payer: COMMERCIAL

## 2023-08-28 DIAGNOSIS — Z23 NEED FOR VACCINATION: Primary | ICD-10-CM

## 2023-08-28 PROCEDURE — 0134A SARSCOV2 VAC BVL 50MCG/0.5ML: CPT

## 2023-09-06 ENCOUNTER — OFFICE VISIT (OUTPATIENT)
Facility: LOCATION | Age: 53
End: 2023-09-06
Payer: COMMERCIAL

## 2023-09-06 DIAGNOSIS — H93.293 ABNORMAL AUDITORY PERCEPTION OF BOTH EARS: Primary | ICD-10-CM

## 2023-09-06 DIAGNOSIS — H69.91 DYSFUNCTION OF RIGHT EUSTACHIAN TUBE: Primary | ICD-10-CM

## 2023-09-06 PROCEDURE — 92567 TYMPANOMETRY: CPT | Performed by: AUDIOLOGIST

## 2023-09-06 PROCEDURE — 92557 COMPREHENSIVE HEARING TEST: CPT | Performed by: AUDIOLOGIST

## 2023-09-06 PROCEDURE — 99204 OFFICE O/P NEW MOD 45 MIN: CPT | Performed by: OTOLARYNGOLOGY

## 2023-09-06 RX ORDER — METHYLPREDNISOLONE 4 MG/1
TABLET ORAL
Qty: 21 TABLET | Refills: 0 | Status: SHIPPED | OUTPATIENT
Start: 2023-09-06

## 2023-09-06 RX ORDER — FLUTICASONE PROPIONATE 50 MCG
2 SPRAY, SUSPENSION (ML) NASAL DAILY
Qty: 16 G | Refills: 3 | Status: SHIPPED | OUTPATIENT
Start: 2023-09-06

## 2023-09-06 NOTE — PROGRESS NOTES
Denise Solomon was seen for audiometric evaluation today. Referred back to physician.      Adama Barros

## 2023-09-06 NOTE — PROGRESS NOTES
Laura Harrell is a 46year old female. Patient presents with:  Ear Problem    HPI:   57-year-old black female has had pressure decreased hearing in the right ear 2 months duration refractory to medical management. She has had previous ear tubes by myself maybe 10 years ago extruded and did nicely. Current Outpatient Medications   Medication Sig Dispense Refill    Omeprazole 40 MG Oral Capsule Delayed Release Take 1 capsule (40 mg total) by mouth daily. Take 1 capsule by mouth daily before breakfast. 90 capsule 3    CELLCEPT 250 MG Oral Cap Take 3 capsules twice daily 540 capsule 1    tacrolimus 1 MG Oral Cap Take 4mg am and 3mg pm 630 capsule 1    Budesonide 1 MG/2ML Inhalation Suspension Take one container (has 1mg/2mL budesonide) 1. Open the individual medicine container (Budesonide respule) by gently twisting off the top tab. 2.Pour the liquid medicine into a small cup (measuring cup or medicine cup). A Budesonide respule contains 2 ml or less than  teaspoon of medicine. 3.Add apple sauce. Mix the solution until it is a slurry-like consistency for one dose. The goal is to make the solution into a thickened consistency with sufficient volume to coat the esophagus. 4. Swallow all the medicine solution. 5. Do not eat or drink anything for one hour after taking the medicine solution. You may rinse your mouth and spit out the water or brush your teeth after you swallow the medicine, but do not swallow the water. 200 each 0    Fluticasone Propionate  MCG/ACT Inhalation Aerosol Two sprays orally, TWICE a day. Please spray to back of throat and then SWALLOW (do not inhale). Do not eat/drink for 60 min after taking. 1 each 1    azelastine 0.1 % Nasal Solution 2 sprays by Nasal route nightly as needed (for sinus congestion). (Patient not taking: Reported on 7/17/2023) 30 mL 0    losartan 25 MG Oral Tab Take 1 tablet (25 mg total) by mouth daily.  90 tablet 3    atorvastatin 20 MG Oral Tab Take 1 tablet (20 mg total) by mouth nightly. 90 tablet 3    acetaminophen 325 MG Oral Tab Take 2 tablets (650 mg total) by mouth every 6 (six) hours as needed. Past Medical History:   Diagnosis Date    Anesthesia complication     difficult time waking up per patient    Constipation     Always    Extrinsic asthma, unspecified     Pt believes she has grown out of this. Focal segmental glomerulosclerosis     Heart murmur     High blood pressure     High cholesterol     Iritis, secondary     Kidney transplant recipient       Social History:  Social History     Socioeconomic History    Marital status: Single   Tobacco Use    Smoking status: Never     Passive exposure: Never    Smokeless tobacco: Never   Vaping Use    Vaping Use: Never used   Substance and Sexual Activity    Alcohol use: No    Drug use: Never    Sexual activity: Yes     Partners: Male      Past Surgical History:   Procedure Laterality Date    COLONOSCOPY      [place]    EGD      [place]    ORGAN TRANSPLANT  01/29/2010    kidney         REVIEW OF SYSTEMS:   GENERAL HEALTH: feels well otherwise  GENERAL : denies fever, chills, sweats, weight loss, weight gain  SKIN: denies any unusual skin lesions or rashes  RESPIRATORY: denies shortness of breath with exertion  NEURO: denies headaches    EXAM:   [facility] 01/26/2016     System Pertinent findings Details   Constitutional  Overall appearance - Normal.   Psychiatric  Orientation - Oriented to time, place, person & situation. Appropriate mood and affect. Head/Face  Facial features -- Normal. Skull - Normal.   Eyes  Pupils equal ,round ,react to light and accomidate   Ears  External Ear Right: Normal, Left: Normal. Canal - Right: Normal, Left: Normal. TM - Right: Retracted TM no middle ear effusion left: Clear   Nose  External Nose, Normal, Septum -midline nasal Vault, clear,Turbinates - Right: Normal left: Normal   Mouth/Throat  Lips/teeth/gums - Normal. Tonsils -1+.  Oropharynx - Normal.   Neck Exam  Inspection - Normal. Palpation - Normal. Parotid gland - Normal. Thyroid gland -normal   Neurological  Cranial nerves - Cranial nerves II through XII grossly intact. Nasopharynx   Normal        Lymph Detail  Submental. Submandibular. Anterior cervical. Posterior cervical. Supraclavicular. Audiogram today shows conductive hearing loss in right ear with negative pressure tympanogram left ear normal    ASSESSMENT AND PLAN:   1. Dysfunction of right eustachian tube  Medrol 4 mg Dosepak  Flonase nasal spray  Follow-up 1 month with pre-clinic audiogram  If not improved consider reinsertion ear tube      The patient indicates understanding of these issues and agrees to the plan.       Kimberly Rey MD  9/6/2023  5:54 PM

## 2023-09-11 ENCOUNTER — OFFICE VISIT (OUTPATIENT)
Facility: CLINIC | Age: 53
End: 2023-09-11
Payer: COMMERCIAL

## 2023-09-11 VITALS
SYSTOLIC BLOOD PRESSURE: 128 MMHG | HEIGHT: 64 IN | HEART RATE: 83 BPM | WEIGHT: 185 LBS | BODY MASS INDEX: 31.58 KG/M2 | DIASTOLIC BLOOD PRESSURE: 80 MMHG

## 2023-09-11 DIAGNOSIS — Z87.410 HISTORY OF CERVICAL DYSPLASIA: ICD-10-CM

## 2023-09-11 DIAGNOSIS — Z01.419 ENCOUNTER FOR ANNUAL ROUTINE GYNECOLOGICAL EXAMINATION: Primary | ICD-10-CM

## 2023-09-11 PROCEDURE — 87625 HPV TYPES 16 & 18 ONLY: CPT | Performed by: STUDENT IN AN ORGANIZED HEALTH CARE EDUCATION/TRAINING PROGRAM

## 2023-09-11 PROCEDURE — 88175 CYTOPATH C/V AUTO FLUID REDO: CPT | Performed by: STUDENT IN AN ORGANIZED HEALTH CARE EDUCATION/TRAINING PROGRAM

## 2023-09-11 PROCEDURE — 87624 HPV HI-RISK TYP POOLED RSLT: CPT | Performed by: STUDENT IN AN ORGANIZED HEALTH CARE EDUCATION/TRAINING PROGRAM

## 2023-09-13 LAB — HPV I/H RISK 1 DNA SPEC QL NAA+PROBE: POSITIVE

## 2023-09-14 LAB
HPV16 DNA CVX QL PROBE+SIG AMP: NEGATIVE
HPV18 DNA CVX QL PROBE+SIG AMP: NEGATIVE

## 2023-09-25 ENCOUNTER — TELEPHONE (OUTPATIENT)
Facility: CLINIC | Age: 53
End: 2023-09-25

## 2023-09-25 NOTE — TELEPHONE ENCOUNTER
RN called and spoke to pt, informed her it is ok to hold oral budesonide am of procedure. Pt verbalized understanding.

## 2023-10-04 ENCOUNTER — TELEPHONE (OUTPATIENT)
Dept: FAMILY MEDICINE CLINIC | Facility: CLINIC | Age: 53
End: 2023-10-04

## 2023-10-04 DIAGNOSIS — Z12.83 SCREENING EXAM FOR SKIN CANCER: Primary | ICD-10-CM

## 2023-10-05 NOTE — TELEPHONE ENCOUNTER
Placing new derm referral as old referral apparently does not take patient's insurance.      Sridevi Lopez MD, 10/04/23, 9:08 PM

## 2023-10-06 ENCOUNTER — TELEPHONE (OUTPATIENT)
Dept: CASE MANAGEMENT | Age: 53
End: 2023-10-06

## 2023-10-06 DIAGNOSIS — Z12.11 COLON CANCER SCREENING: Primary | ICD-10-CM

## 2023-10-06 NOTE — TELEPHONE ENCOUNTER
Good Morning    Please review pended referral for Gastro, Dr Leonard Nuñez for colon screening for consult on 8/9/23. Patient has [facility] Exchange Silver O plan which requires an authorized referral be obtained online with her [facility] plan for all specialists visits. Patient is scheduled for an upcoming procedure with Gastro on 10/16/23.  is unable to obtain prior auth for this procedure since no authorized referral was obtained before her consult . Please have office staff remind patient that she needs to request and confirm that an authorized referral has been obtained by [facility] before any specialist appt . Thank you for your assistance.     [facility] [place]

## 2023-10-06 NOTE — TELEPHONE ENCOUNTER
Message  Received: Today  Shae Mejía A  P [facility] Front Office; P [facility] Medical Assistant  Hello    The referred to provider for this referral, Dr. Geraldo Parks is returning as Out of Network for the patients [facility] plan. This referral will be closed pending an update to an In network provider. Please advise if you have any questions. Thank you  Jessie    SPOKE TO PATIENT TO LET HER KNOW DR. Delfina Martinez IS NOT IN NETWORK. SHE CAN CALL HER INSURANCE COMPANY AND GET NAME OF DERM IN NETWORK AND LET US KNOW TO DO ANOTHER REFERRAL.

## 2023-10-06 NOTE — TELEPHONE ENCOUNTER
I placed a gastro referral for this patient. Can we have it sent to [facility]?     Joe Bender MD, 10/06/23, 11:59 AM

## 2023-10-06 NOTE — TELEPHONE ENCOUNTER
Good Morning    [facility] Silver Exchange HMO Insurance    This  HMO health plan requires an authorized referral be obtained for ALL specialists online.  is unable to submit prior auth for surgery on [employer] website for 10/16/23 since no authorized referral is on file with [facility]. There is no authorized referral on file for Gastro from PCP Dr Anaid Castle    Please advise  staff and office that [facility] requires an authorized referral for all office visits. I will send a message to PCP Renetta Abraham requesting a referral for consult for Gastro. Once this is pended & signed off we will work the consult referral first & then we can proceed with trying to submit surgery case again.     Thank you for your help    [facility]

## 2023-10-16 ENCOUNTER — ANESTHESIA (OUTPATIENT)
Dept: ENDOSCOPY | Facility: HOSPITAL | Age: 53
End: 2023-10-16
Payer: COMMERCIAL

## 2023-10-16 ENCOUNTER — HOSPITAL ENCOUNTER (OUTPATIENT)
Facility: HOSPITAL | Age: 53
Setting detail: HOSPITAL OUTPATIENT SURGERY
Discharge: HOME | End: 2023-10-16
Attending: INTERNAL MEDICINE | Admitting: INTERNAL MEDICINE
Payer: COMMERCIAL

## 2023-10-16 ENCOUNTER — ANESTHESIA EVENT (OUTPATIENT)
Dept: ENDOSCOPY | Facility: HOSPITAL | Age: 53
End: 2023-10-16
Payer: COMMERCIAL

## 2023-10-16 VITALS
RESPIRATION RATE: 17 BRPM | SYSTOLIC BLOOD PRESSURE: 113 MMHG | OXYGEN SATURATION: 97 % | BODY MASS INDEX: 31.41 KG/M2 | WEIGHT: 184 LBS | HEIGHT: 64 IN | HEART RATE: 86 BPM | DIASTOLIC BLOOD PRESSURE: 65 MMHG

## 2023-10-16 DIAGNOSIS — K20.90 ESOPHAGITIS: ICD-10-CM

## 2023-10-16 DIAGNOSIS — K20.0 EOSINOPHILIC ESOPHAGITIS: ICD-10-CM

## 2023-10-16 DIAGNOSIS — K44.9 HIATAL HERNIA: Primary | ICD-10-CM

## 2023-10-16 DIAGNOSIS — R13.10 DYSPHAGIA, UNSPECIFIED TYPE: ICD-10-CM

## 2023-10-16 PROCEDURE — 0DB28ZX EXCISION OF MIDDLE ESOPHAGUS, VIA NATURAL OR ARTIFICIAL OPENING ENDOSCOPIC, DIAGNOSTIC: ICD-10-PCS | Performed by: INTERNAL MEDICINE

## 2023-10-16 PROCEDURE — 0DB38ZX EXCISION OF LOWER ESOPHAGUS, VIA NATURAL OR ARTIFICIAL OPENING ENDOSCOPIC, DIAGNOSTIC: ICD-10-PCS | Performed by: INTERNAL MEDICINE

## 2023-10-16 PROCEDURE — 43239 EGD BIOPSY SINGLE/MULTIPLE: CPT | Performed by: INTERNAL MEDICINE

## 2023-10-16 RX ORDER — SODIUM CHLORIDE, SODIUM LACTATE, POTASSIUM CHLORIDE, CALCIUM CHLORIDE 600; 310; 30; 20 MG/100ML; MG/100ML; MG/100ML; MG/100ML
INJECTION, SOLUTION INTRAVENOUS CONTINUOUS
Status: DISCONTINUED | OUTPATIENT
Start: 2023-10-16 | End: 2023-10-16

## 2023-10-16 RX ORDER — SODIUM CHLORIDE, SODIUM LACTATE, POTASSIUM CHLORIDE, CALCIUM CHLORIDE 600; 310; 30; 20 MG/100ML; MG/100ML; MG/100ML; MG/100ML
INJECTION, SOLUTION INTRAVENOUS CONTINUOUS PRN
Status: DISCONTINUED | OUTPATIENT
Start: 2023-10-16 | End: 2023-10-16 | Stop reason: SURG

## 2023-10-16 RX ORDER — LIDOCAINE HYDROCHLORIDE 10 MG/ML
INJECTION, SOLUTION EPIDURAL; INFILTRATION; INTRACAUDAL; PERINEURAL AS NEEDED
Status: DISCONTINUED | OUTPATIENT
Start: 2023-10-16 | End: 2023-10-16 | Stop reason: SURG

## 2023-10-16 RX ORDER — SODIUM CHLORIDE 9 MG/ML
INJECTION, SOLUTION INTRAVENOUS CONTINUOUS
Status: DISCONTINUED | OUTPATIENT
Start: 2023-10-16 | End: 2023-10-16

## 2023-10-16 RX ORDER — NALOXONE HYDROCHLORIDE 0.4 MG/ML
80 INJECTION, SOLUTION INTRAMUSCULAR; INTRAVENOUS; SUBCUTANEOUS AS NEEDED
Status: DISCONTINUED | OUTPATIENT
Start: 2023-10-16 | End: 2023-10-16

## 2023-10-16 RX ADMIN — SODIUM CHLORIDE, SODIUM LACTATE, POTASSIUM CHLORIDE, CALCIUM CHLORIDE: 600; 310; 30; 20 INJECTION, SOLUTION INTRAVENOUS at 15:20:00

## 2023-10-16 RX ADMIN — LIDOCAINE HYDROCHLORIDE 50 MG: 10 INJECTION, SOLUTION EPIDURAL; INFILTRATION; INTRACAUDAL; PERINEURAL at 15:30:00

## 2023-10-16 RX ADMIN — SODIUM CHLORIDE, SODIUM LACTATE, POTASSIUM CHLORIDE, CALCIUM CHLORIDE: 600; 310; 30; 20 INJECTION, SOLUTION INTRAVENOUS at 15:35:00

## 2023-10-16 NOTE — OPERATIVE REPORT
ESOPHAGOSCOPY REPORT    Wesley Llamas     1970 Age 48year old   PCP Hussein Lopez MD Endoscopist [place] Jiménez MD       Date of procedure: 10/16/23      Procedure: esophagoscopy w/biopsies    Pre-operative diagnosis: acid reflux and eosinophilic esophagitis    Post-operative diagnosis: esophageal scarring, hiatal hernia    Medications: MAC sedation    Complications: none    Procedure:  Informed consent was obtained from the patient after the risks of the procedure were discussed, including but not limited to bleeding, perforation, aspiration, infection, or possibility of a missed lesion. After discussions of the risks/benefits and alternatives to this procedure, as well as the planned sedation, the patient was placed in the left lateral decubitus position and begun on continuous blood pressure pulse oximetry and EKG monitoring and this was maintained throughout the procedure. Once an adequate level of sedation was obtained a bite block was placed. Then the lubricated tip of the [id number] diagnostic video upper endoscope was inserted and advanced using direct visualization into the posterior pharynx and ultimately into the esophagus. Complications: None    Findings:      1. Esophagus: The squamocolumnar junction was noted at 35 cm and appeared irregular with a wide open ring. The GE junction was noted at 35 cm from the incisors. Hiatus was at 36-37 cm so there was a hiatal hernia. The esophageal mucosa appeared scarred and with history of esophagitis and EOE follow up biopsies taken for EOE  2. Stomach: Limited exam due to indication of procedure. The stomach distended normally. Normal rugal folds were seen. The pylorus was patent. Retroflexion revealed a normal fundus and a normal cardia. 3. Duodenum: normal on limited exam    Impression:  1. Scarring still present but improved    Recommend:  1.  Here are some reflux precautions:   - Avoid trigger foods  - Anti-reflux measures: raising the head of the bed at night, avoiding tight clothing or belts, avoiding eating late at night and not lying down shortly after mealtime and achieving weight loss   - Avoid NSAID's, caffeine, peppermints, alcohol, tobacco and foods that incite symptoms   2. Pending biopsies will review plan    >>>If tissue was sampled/removed and you have not received your pathology results either by phone or letter within 2 weeks, please call our office at [phone].     Specimens:  Mid and distal esophogeal biopsies

## 2023-10-16 NOTE — ANESTHESIA POSTPROCEDURE EVALUATION
Patient: Nakul Mendez    Procedure Summary       Date: 10/16/23 Room / Location: [address] [place] [address] ENDOSCOPY 04 / [address] [place] [address] ENDOSCOPY    Anesthesia Start: 8141 Anesthesia Stop:     Procedure: ESOPHAGOGASTRODUODENOSCOPY Diagnosis:       Eosinophilic esophagitis      Dysphagia, unspecified type      (history of EOE, esophageal scarring, Hiatal hernia)    Surgeons: Savannah Felder MD Anesthesiologist: Maryam Kraus CRNA    Anesthesia Type: general, MAC ASA Status: 3            Anesthesia Type: general, MAC    Vitals Value Taken Time   BP 95/55 10/16/23 1544   Temp  10/16/23 1544   Pulse 89 10/16/23 1544   Resp 27 10/16/23 1544   SpO2 97 % 10/16/23 1544       [facility] AN Post Evaluation:   Patient Evaluated in Patient location: endo 20. Level of Consciousness: awake  Pain Score: 0  Pain Management: adequate  Airway Patency:patent  Dental exam unchanged from preop  Yes    Cardiovascular Status: stable  Respiratory Status: room air  Postoperative Hydration stable      Maile Forte.  Jessu Chinchilla CRNA  10/16/2023 3:44 PM

## 2023-10-16 NOTE — DISCHARGE INSTRUCTIONS
Home Care Instructions for Gastroscopy with Sedation    Diet:  - Resume your regular diet as tolerated unless otherwise instructed. - Start with light meals to minimize bloating.  - Do not drink alcohol today. Medication:  - If you have questions about resuming your normal medications, please contact your Primary Care Physician. Activities:  - Take it easy today. Do not return to work today. - Do not drive today. - Do not operate any machinery today (including kitchen equipment). Gastroscopy:  - You may have a sore throat for 2-3 days following the exam. This is normal. Gargling with warm salt water (1/2 tsp salt to 1 glass warm water) or using throat lozenges will help. - If you experience any sharp pain in your neck, abdomen or chest, vomiting of blood, oral temperature over 100 degrees Fahrenheit, light-headedness or dizziness, or any other problems, contact your doctor. **If unable to reach your doctor, please go to the [facility] Emergency Room**    - Your referring physician will receive a full report of your examination.  - If you do not hear from your doctor's office within two weeks of your biopsy, please call them for your results. You may be able to see your laboratory results in [facility] between 4 and 7 business days. In some cases, your physician may not have viewed the results before they are released to [address] If you have questions regarding your results contact the physician who ordered the test/exam by phone or via [address] by choosing \"Ask a Medical Question. \"

## 2023-10-17 ENCOUNTER — TELEPHONE (OUTPATIENT)
Facility: CLINIC | Age: 53
End: 2023-10-17

## 2023-10-17 NOTE — TELEPHONE ENCOUNTER
Current Outpatient Medications   Medication Sig Dispense Refill    Budesonide 1 MG/2ML Inhalation Suspension Take one container (has 1mg/2mL budesonide) 1. Open the individual medicine container (Budesonide respule) by gently twisting off the top tab. 2.Pour the liquid medicine into a small cup (measuring cup or medicine cup). A Budesonide respule contains 2 ml or less than  teaspoon of medicine. 3.Add apple sauce. Mix the solution until it is a slurry-like consistency for one dose. The goal is to make the solution into a thickened consistency with sufficient volume to coat the esophagus. 4. Swallow all the medicine solution. 5. Do not eat or drink anything for one hour after taking the medicine solution. You may rinse your mouth and spit out the water or brush your teeth after you swallow the medicine, but do not swallow the water.  200 each 0

## 2023-10-18 NOTE — TELEPHONE ENCOUNTER
Dr. Chris England,   Please see pended refill of budesonide and sign if appropriate.   Thanks,  Yary Wade

## 2023-10-24 ENCOUNTER — PATIENT MESSAGE (OUTPATIENT)
Dept: GASTROENTEROLOGY | Facility: CLINIC | Age: 53
End: 2023-10-24

## 2023-10-25 ENCOUNTER — TELEPHONE (OUTPATIENT)
Facility: CLINIC | Age: 53
End: 2023-10-25

## 2023-10-25 NOTE — TELEPHONE ENCOUNTER
From: Avelino Haro  To: Jonathan Right  Sent: 10/24/2023 11:48 PM CDT  Subject: Test Results    Wanted someone to contact me and discuss test results

## 2023-10-25 NOTE — TELEPHONE ENCOUNTER
Dr. Ky Rainey,   Pt is calling for path results and recommendations. Does she stay on her current treatment? Thanks,  Janette Minaya    Final Diagnosis:      A. Distal esophagus; biopsy:   Squamous mucosa with no significant pathologic change. Negative for increased intraepithelial eosinophils. B.  Mid esophagus; biopsy:   Squamous mucosa with no significant pathologic change. Negative for increased intraepithelial eosinophils.

## 2023-10-26 NOTE — TELEPHONE ENCOUNTER
Great news her eosinophil count is normal now.  She should continue the treatment and if she feels better can slowly come off and then take as needed for symptoms

## 2023-10-27 NOTE — TELEPHONE ENCOUNTER
RN called and spoke to pt, informed her of MD recommendations below. Pt will take half dose of budesonide for 2-3 weeks and then take as needed. Instructed pt to notify GI office if unable to tolerate weaning or prn use. Pt verbalized understanding.

## 2023-11-08 ENCOUNTER — MED REC SCAN ONLY (OUTPATIENT)
Facility: CLINIC | Age: 53
End: 2023-11-08

## 2023-11-20 ENCOUNTER — TELEPHONE (OUTPATIENT)
Dept: FAMILY MEDICINE CLINIC | Facility: CLINIC | Age: 53
End: 2023-11-20

## 2023-11-20 NOTE — TELEPHONE ENCOUNTER
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

## 2023-11-20 NOTE — TELEPHONE ENCOUNTER
Pt scheduled on [facility] for the following:    Appointment For: Remedios Nicholson ([id number])   Visit Type: [address] [place] [address] ([id number])      11/21/2023  10:00 AM  30 mins.   Thurman Brunner             [address]      Patient Comments:   Light hearted/unbalance, pain behind left eye, nausea, headache, feeling of fullness in ear

## 2023-11-20 NOTE — TELEPHONE ENCOUNTER
Last OV  7/17/23  PE             Next OV: 11/21/23  Mychart Appt    Pt made Mychart Appt for  Light hearted/unbalance, pain behind left eye, nausea, headache, feeling of fullness in ear. Left vm to return call.

## 2023-11-21 ENCOUNTER — OFFICE VISIT (OUTPATIENT)
Dept: FAMILY MEDICINE CLINIC | Facility: CLINIC | Age: 53
End: 2023-11-21
Payer: COMMERCIAL

## 2023-11-21 VITALS
WEIGHT: 188 LBS | BODY MASS INDEX: 32.1 KG/M2 | DIASTOLIC BLOOD PRESSURE: 80 MMHG | HEART RATE: 62 BPM | RESPIRATION RATE: 18 BRPM | HEIGHT: 64 IN | OXYGEN SATURATION: 99 % | SYSTOLIC BLOOD PRESSURE: 120 MMHG

## 2023-11-21 DIAGNOSIS — R42 VERTIGO: Primary | ICD-10-CM

## 2023-11-21 DIAGNOSIS — R26.89 IMBALANCE: ICD-10-CM

## 2023-11-21 DIAGNOSIS — R55 SYNCOPE, UNSPECIFIED SYNCOPE TYPE: ICD-10-CM

## 2023-11-21 DIAGNOSIS — G43.809 OTHER MIGRAINE WITHOUT STATUS MIGRAINOSUS, NOT INTRACTABLE: ICD-10-CM

## 2023-11-21 PROCEDURE — 3008F BODY MASS INDEX DOCD: CPT | Performed by: STUDENT IN AN ORGANIZED HEALTH CARE EDUCATION/TRAINING PROGRAM

## 2023-11-21 PROCEDURE — 3079F DIAST BP 80-89 MM HG: CPT | Performed by: STUDENT IN AN ORGANIZED HEALTH CARE EDUCATION/TRAINING PROGRAM

## 2023-11-21 PROCEDURE — 99213 OFFICE O/P EST LOW 20 MIN: CPT | Performed by: STUDENT IN AN ORGANIZED HEALTH CARE EDUCATION/TRAINING PROGRAM

## 2023-11-21 PROCEDURE — 3074F SYST BP LT 130 MM HG: CPT | Performed by: STUDENT IN AN ORGANIZED HEALTH CARE EDUCATION/TRAINING PROGRAM

## 2023-11-21 RX ORDER — SUMATRIPTAN 100 MG/1
100 TABLET, FILM COATED ORAL DAILY PRN
Qty: 30 TABLET | Refills: 0 | Status: SHIPPED | OUTPATIENT
Start: 2023-11-21

## 2023-11-21 RX ORDER — MECLIZINE HYDROCHLORIDE 25 MG/1
25 TABLET ORAL 3 TIMES DAILY PRN
Qty: 30 TABLET | Refills: 0 | Status: SHIPPED | OUTPATIENT
Start: 2023-11-21

## 2024-02-06 ENCOUNTER — PATIENT MESSAGE (OUTPATIENT)
Dept: FAMILY MEDICINE CLINIC | Facility: CLINIC | Age: 54
End: 2024-02-06

## 2024-02-06 NOTE — TELEPHONE ENCOUNTER
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  Low back pain with stiffness,  back doesn't feel strong with lifting.  Denies  severe pain,numbness, tingling, weakness  in legs, loss of bowel/bladder control.  Scheduled for office visit, reviewed when to seek emergency care. Pt verbalizes understanding.    Future Appointments   Date Time Provider Department Center   2/8/2024  7:00 AM Shade Lopez MD [facility] [place]

## 2024-02-06 NOTE — TELEPHONE ENCOUNTER
From: Sheela Gillespie  To: Shade Lopez  Sent: 2/6/2024 8:06 AM CST  Subject: Low Back    I hurt my low back lifting. I've also loss some strength.I want to make sure its not disk related

## 2024-02-08 ENCOUNTER — OFFICE VISIT (OUTPATIENT)
Dept: FAMILY MEDICINE CLINIC | Facility: CLINIC | Age: 54
End: 2024-02-08
Payer: COMMERCIAL

## 2024-02-08 ENCOUNTER — OFFICE VISIT (OUTPATIENT)
Dept: NEPHROLOGY | Facility: CLINIC | Age: 54
End: 2024-02-08
Payer: COMMERCIAL

## 2024-02-08 ENCOUNTER — TELEPHONE (OUTPATIENT)
Dept: NEPHROLOGY | Facility: CLINIC | Age: 54
End: 2024-02-08

## 2024-02-08 ENCOUNTER — HOSPITAL ENCOUNTER (OUTPATIENT)
Dept: GENERAL RADIOLOGY | Age: 54
Discharge: HOME | End: 2024-02-08
Attending: STUDENT IN AN ORGANIZED HEALTH CARE EDUCATION/TRAINING PROGRAM
Payer: COMMERCIAL

## 2024-02-08 VITALS
HEART RATE: 70 BPM | RESPIRATION RATE: 16 BRPM | HEIGHT: 64 IN | OXYGEN SATURATION: 99 % | BODY MASS INDEX: 31.58 KG/M2 | DIASTOLIC BLOOD PRESSURE: 80 MMHG | SYSTOLIC BLOOD PRESSURE: 130 MMHG | WEIGHT: 185 LBS | TEMPERATURE: 97 F

## 2024-02-08 VITALS — DIASTOLIC BLOOD PRESSURE: 90 MMHG | SYSTOLIC BLOOD PRESSURE: 126 MMHG | BODY MASS INDEX: 33 KG/M2 | WEIGHT: 194 LBS

## 2024-02-08 DIAGNOSIS — M87.059 AVASCULAR NECROSIS OF BONE OF HIP, UNSPECIFIED LATERALITY (HCC): ICD-10-CM

## 2024-02-08 DIAGNOSIS — M54.50 LUMBAR BACK PAIN: ICD-10-CM

## 2024-02-08 DIAGNOSIS — Z94.0 KIDNEY TRANSPLANT RECIPIENT: ICD-10-CM

## 2024-02-08 DIAGNOSIS — Z94.0 RENAL TRANSPLANT RECIPIENT: Primary | ICD-10-CM

## 2024-02-08 DIAGNOSIS — M54.50 LUMBAR BACK PAIN: Primary | ICD-10-CM

## 2024-02-08 DIAGNOSIS — Z23 NEED FOR VACCINATION: ICD-10-CM

## 2024-02-08 DIAGNOSIS — I10 ESSENTIAL HYPERTENSION: ICD-10-CM

## 2024-02-08 DIAGNOSIS — E78.5 HYPERLIPIDEMIA, UNSPECIFIED HYPERLIPIDEMIA TYPE: ICD-10-CM

## 2024-02-08 LAB
ABSOLUTE BASOPHILS: 32 CELLS/UL (ref 0–200)
ABSOLUTE EOSINOPHILS: 109 CELLS/UL (ref 15–500)
ABSOLUTE LYMPHOCYTES: 2803 CELLS/UL (ref 850–3900)
ABSOLUTE MONOCYTES: 467 CELLS/UL (ref 200–950)
ABSOLUTE NEUTROPHILS: 2989 CELLS/UL (ref 1500–7800)
ALBUMIN/GLOBULIN RATIO: 1.5 (CALC) (ref 1–2.5)
ALBUMIN: 4.3 G/DL (ref 3.6–5.1)
ALKALINE PHOSPHATASE: 111 U/L (ref 37–153)
ALT: 14 U/L (ref 6–29)
APPEARANCE: CLEAR
AST: 19 U/L (ref 10–35)
BASOPHILS: 0.5 %
BILIRUBIN, TOTAL: 0.7 MG/DL (ref 0.2–1.2)
BILIRUBIN: NEGATIVE
BUN: 16 MG/DL (ref 7–25)
CALCIUM: 9.5 MG/DL (ref 8.6–10.4)
CARBON DIOXIDE: 28 MMOL/L (ref 20–32)
CHLORIDE: 108 MMOL/L (ref 98–110)
CHOL/HDLC RATIO: 2.9 (CALC)
CHOLESTEROL, TOTAL: 191 MG/DL
COLOR: YELLOW
CREATININE, RANDOM URINE: 166 MG/DL (ref 20–275)
CREATININE: 1 MG/DL (ref 0.5–1.03)
EGFR: 67 ML/MIN/1.73M2
EOSINOPHILS: 1.7 %
GLOBULIN: 2.9 G/DL (CALC) (ref 1.9–3.7)
GLUCOSE: 92 MG/DL (ref 65–99)
GLUCOSE: NEGATIVE
HDL CHOLESTEROL: 65 MG/DL
HEMATOCRIT: 41.5 % (ref 35–45)
HEMOGLOBIN: 13.5 G/DL (ref 11.7–15.5)
KETONES: NEGATIVE
LDL-CHOLESTEROL: 112 MG/DL (CALC)
LYMPHOCYTES: 43.8 %
MCH: 26.6 PG (ref 27–33)
MCHC: 32.5 G/DL (ref 32–36)
MCV: 81.7 FL (ref 80–100)
MICROALBUMIN/CREATININE RATIO, RANDOM URINE: 11 MCG/MG CREAT
MICROALBUMIN: 1.9 MG/DL
MONOCYTES: 7.3 %
MPV: 10 FL (ref 7.5–12.5)
NEUTROPHILS: 46.7 %
NITRITE: NEGATIVE
NON-HDL CHOLESTEROL: 126 MG/DL (CALC)
OCCULT BLOOD: NEGATIVE
PLATELET COUNT: 332 THOUSAND/UL (ref 140–400)
POTASSIUM: 4.5 MMOL/L (ref 3.5–5.3)
PROTEIN, TOTAL: 7.2 G/DL (ref 6.1–8.1)
PROTEIN: NEGATIVE
RDW: 13.1 % (ref 11–15)
RED BLOOD CELL COUNT: 5.08 MILLION/UL (ref 3.8–5.1)
SODIUM: 142 MMOL/L (ref 135–146)
SPECIFIC GRAVITY: 1.02 (ref 1–1.03)
TACROLIMUS, HIGHLY SENSITIVE, /MS/MS: <1 MCG/L
TRIGLYCERIDES: 53 MG/DL
WHITE BLOOD CELL COUNT: 6.4 THOUSAND/UL (ref 3.8–10.8)

## 2024-02-08 PROCEDURE — 99214 OFFICE O/P EST MOD 30 MIN: CPT | Performed by: INTERNAL MEDICINE

## 2024-02-08 PROCEDURE — 72110 X-RAY EXAM L-2 SPINE 4/>VWS: CPT | Performed by: STUDENT IN AN ORGANIZED HEALTH CARE EDUCATION/TRAINING PROGRAM

## 2024-02-08 PROCEDURE — 90471 IMMUNIZATION ADMIN: CPT | Performed by: STUDENT IN AN ORGANIZED HEALTH CARE EDUCATION/TRAINING PROGRAM

## 2024-02-08 PROCEDURE — 90686 IIV4 VACC NO PRSV 0.5 ML IM: CPT | Performed by: STUDENT IN AN ORGANIZED HEALTH CARE EDUCATION/TRAINING PROGRAM

## 2024-02-08 PROCEDURE — 99213 OFFICE O/P EST LOW 20 MIN: CPT | Performed by: STUDENT IN AN ORGANIZED HEALTH CARE EDUCATION/TRAINING PROGRAM

## 2024-02-08 RX ORDER — METHYLPREDNISOLONE 4 MG/1
TABLET ORAL
Qty: 21 EACH | Refills: 0 | Status: SHIPPED | OUTPATIENT
Start: 2024-02-08

## 2024-02-08 RX ORDER — TIZANIDINE 4 MG/1
4 TABLET ORAL EVERY 6 HOURS PRN
Qty: 30 TABLET | Refills: 0 | Status: SHIPPED | OUTPATIENT
Start: 2024-02-08

## 2024-02-08 NOTE — PROGRESS NOTES
Nephrology Progress Note      Sheela Gillespie is a 53 year old female.    HPI:     Chief Complaint   Patient presents with    Renal Transplant    Hypertension       Sheela Gillespie was seen in the nephrology clinic today in follow-up for management of hypertension in the setting of renal transplant.  Since her last clinic visit she reports that overall she has felt well although did leave her job.  Subsequently she has run out of tacrolimus and mycophenolate and has not taken either for the past 2 to 3 days.  Overall she feels well and the review of systems is unremarkable      HISTORY:  Past Medical History:   Diagnosis Date    Constipation     Always    Extrinsic asthma, unspecified     Pt believes she has grown out of this.    Focal segmental glomerulosclerosis     Heart murmur     High blood pressure     High cholesterol     Iritis, secondary     Kidney transplant recipient     Renal disorder       Past Surgical History:   Procedure Laterality Date    COLONOSCOPY      [place]    EGD      [place]    ORGAN TRANSPLANT  01/29/2010    kidney      Family History   Problem Relation Age of Onset    Cancer Father         pancreatic, lung , throat     Hypertension Mother         unknown    Stroke Mother         unknown    Other (Osteoporosis) Mother     Cancer Maternal Grandmother         unknown    Heart Disease Maternal Grandmother         unknown    Stroke Maternal Grandmother         unknown    Cancer Maternal Grandfather         unknown    Cancer Paternal Grandmother         unknown    Breast Cancer Paternal Grandmother     Cancer Paternal Grandfather         unknown    Breast Cancer Maternal Cousin Female     Breast Cancer Maternal Cousin Female     Breast Cancer Maternal Cousin Female     Breast Cancer Maternal Cousin Female     Breast Cancer Maternal Cousin Female     Cancer Other       Social History:   Social History     Socioeconomic History    Marital status: Single   Tobacco Use    Smoking status: Never      Passive exposure: Never    Smokeless tobacco: Never   Vaping Use    Vaping Use: Never used   Substance and Sexual Activity    Alcohol use: No    Drug use: Never    Sexual activity: Not Currently     Partners: Male        Medications (Active prior to today's visit):  Current Outpatient Medications   Medication Sig Dispense Refill    methylPREDNISolone (MEDROL) 4 MG Oral Tablet Therapy Pack As directed. 21 each 0    tiZANidine 4 MG Oral Tab Take 1 tablet (4 mg total) by mouth every 6 (six) hours as needed. 30 tablet 0    SUMAtriptan Succinate 100 MG Oral Tab Take 1 tablet (100 mg total) by mouth daily as needed for Migraine. Take 1 tablet of the sumatriptan medication within the first 30 minutes of experiencing a migraine. If after 2 hours there is no improvement in your headache you may take a second dose. Do NOT take more than 200 mg (or basically 2 tablets) of sumatriptan in a 24 hour period. Avoid using sumatriptan more than 10 days in a month as overuse headache can occur. 30 tablet 0    meclizine 25 MG Oral Tab Take 1 tablet (25 mg total) by mouth 3 (three) times daily as needed for Dizziness. 30 tablet 0    Budesonide 1 MG/2ML Inhalation Suspension Take one container (has 1mg/2mL budesonide) 1.Open the individual medicine container (Budesonide respule) by gently twisting off the top tab. 2.Pour the liquid medicine into a small cup (measuring cup or medicine cup). A Budesonide respule contains 2 ml or less than  teaspoon of medicine. 3.Add apple sauce. Mix the solution until it is a slurry-like consistency for one dose. The goal is to make the solution into a thickened consistency with sufficient volume to coat the esophagus. 4. Swallow all the medicine solution. 5. Do not eat or drink anything for one hour after taking the medicine solution. You may rinse your mouth and spit out the water or brush your teeth after you swallow the medicine, but do not swallow the water. 200 each 0    fluticasone propionate 50  MCG/ACT Nasal Suspension 2 sprays by Nasal route daily. 16 g 3    Omeprazole 40 MG Oral Capsule Delayed Release Take 1 capsule (40 mg total) by mouth daily. Take 1 capsule by mouth daily before breakfast. 90 capsule 3    CELLCEPT 250 MG Oral Cap Take 3 capsules twice daily 540 capsule 1    tacrolimus 1 MG Oral Cap Take 4mg am and 3mg pm 630 capsule 1    losartan 25 MG Oral Tab Take 1 tablet (25 mg total) by mouth daily. 90 tablet 3    atorvastatin 20 MG Oral Tab Take 1 tablet (20 mg total) by mouth nightly. 90 tablet 3    acetaminophen 325 MG Oral Tab Take 2 tablets (650 mg total) by mouth every 6 (six) hours as needed.         Allergies:  Allergies   Allergen Reactions    Hydrocodone-Acetaminophen OTHER (SEE COMMENTS), ANXIETY and NAUSEA AND VOMITING     [facility] Allergy Text Annotation: Vicodin           ROS:     Denies fever/chills  Denies wt loss/gain  Denies HA or visual changes  Denies CP or palpitations  Denies SOB/cough/hemoptysis  Denies abd or flank pain  Denies N/V/D  Denies change in urinary habits or gross hematuria  Denies LE edema  Denies skin rashes/myalgias/arthralgias      PHYSICAL EXAM:   /90 (BP Location: Left arm, Patient Position: Sitting)   Wt 194 lb (88 kg)   LMP 01/26/2016 (Exact Date)   BMI 33.30 kg/m²   Wt Readings from Last 6 Encounters:   02/08/24 194 lb (88 kg)   02/08/24 185 lb (83.9 kg)   11/21/23 188 lb (85.3 kg)   10/16/23 184 lb (83.5 kg)   09/11/23 185 lb (83.9 kg)   08/09/23 182 lb (82.6 kg)       General: Alert and oriented in no apparent distress.  HEENT: No scleral icterus, MMM  Neck: Supple, no MARELY or thyromegaly  Cardiac: Regular rate and rhythm, S1, S2 normal, no murmur or rub  Lungs: Clear without wheezes, rales, rhonchi.    Extremities: Without clubbing, cyanosis or edema.  Neurologic: Alert and oriented, normal affect, cranial nerves grossly intact, moving all extremities  Skin: Warm and dry, no rashes      LABS:     Lab Results   Component Value Date    BUN 16  02/06/2024    BUNCREA SEE NOTE: 02/06/2024    CREATSERUM 1.00 02/06/2024    GFR 72 03/31/2016    GFRNAA 64 06/27/2022    GFRAA 75 06/27/2022    CA 9.5 02/06/2024    ALKPHO 111 02/06/2024    AST 19 02/06/2024    ALT 14 02/06/2024    BILT 0.7 02/06/2024    TP 7.2 02/06/2024    ALB 4.3 02/06/2024    GLOBULIN 2.9 02/06/2024    AGRATIO 1.5 02/06/2024     02/06/2024    K 4.5 02/06/2024     02/06/2024    CO2 28 02/06/2024     Lab Results   Component Value Date    RBC 5.08 02/06/2024    HGB 13.5 02/06/2024    HCT 41.5 02/06/2024     02/06/2024    MPV 9.0 (L) 02/15/2013    MCV 81.7 02/06/2024    MCH 26.6 (L) 02/06/2024    MCHC 32.5 02/06/2024    RDW 13.1 02/06/2024    NEPRELIM 5.09 03/31/2016    NEPERCENT 46.7 02/06/2024    LYPERCENT 43.8 02/06/2024    MOPERCENT 7.3 02/06/2024    EOPERCENT 1.7 02/06/2024    BAPERCENT 0.5 02/06/2024    NE 2,989 02/06/2024    LYMABS 2,803 02/06/2024    MOABSO 467 02/06/2024    EOABSO 109 02/06/2024    BAABSO 32 02/06/2024     Lab Results   Component Value Date    CREUR 103.3 10/04/2014    MICROALBUMIN 1.9 02/06/2024    CREAUR 166 02/06/2024    MALBCREACALC 11 02/06/2024     Lab Results   Component Value Date    CLARITY CLEAR 02/06/2024    SPECGRAVITY 1.020 02/06/2024    GLUUR NEGATIVE 02/06/2024    BILUR NEGATIVE 02/06/2024    KETUR NEGATIVE 02/06/2024    BLOODURINE Moderate (A) 04/01/2016    PHURINE < OR = 5.0 02/06/2024    PROUR NEGATIVE 02/06/2024    UROBILINOGEN <2.0 04/01/2016    NITRITE NEGATIVE 02/06/2024    LEUUR 1+ (A) 02/06/2024    NMIC NOT INDICATED 02/15/2013    WBCUR 10-20 (A) 02/06/2024    RBCUR NONE SEEN 02/06/2024    EPIUR 6-10 (A) 02/06/2024    BACUR NONE SEEN 02/06/2024    HYLUR NONE SEEN 02/06/2024     ASSESSMENT/PLAN:     #1.  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  Overall renal function remains normal with most recent creatinine at 1.0 mg/dL.  Tacrolimus level was noted to be  undetectable and upon further discussion this is certainly the case that she has not taken her medications for the past few days due to affordability issues.  I reinforced with her the importance of continuing her immunosuppression to not lose her transplant I have asked her to reach out to [facility] this morning to discuss her predicament and the hope that there are programs and/or safeguards in place that we will allow her to continue her immunosuppression and not lose her transplant.  She assures me that she will call them upon leaving the office    #2.  Hypertension-her blood pressure is excellent in the office today and she will continue losartan    #3.  Hyperlipidemia-she has remained on Lipitor.  LDL is mildly high and we did discuss this.    Thank you again for allowing me to participate in care of your patient.  Please do not hesitate to call with any questions or concerns.        Silvino Mcdonnell MD  2/8/2024  9:22 AM

## 2024-02-08 NOTE — PROGRESS NOTES
[facility] Family Medicine Office Note    HPI:     Sheela Gillespie is a 53 year old female presenting for back pain.     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 Pain goes across lower lumbar region with irritation in gluteus region going into left thigh. Denies keysha numbness, tingling, or incontinence. Pain can be exacerbated \"the more she moves\" through the day. Relatively constant now. At rest more of an uncomfortable sensation, but with more movement throughout the day the pain can be more significant.     HISTORY:  Past Medical History:   Diagnosis Date    Constipation     Always    Extrinsic asthma, unspecified     Pt believes she has grown out of this.    Focal segmental glomerulosclerosis     Heart murmur     High blood pressure     High cholesterol     Iritis, secondary     Kidney transplant recipient     Renal disorder       Past Surgical History:   Procedure Laterality Date    COLONOSCOPY      [place]    EGD      [place]    ORGAN TRANSPLANT  01/29/2010    kidney      Family History   Problem Relation Age of Onset    Cancer Father         pancreatic, lung , throat     Hypertension Mother         unknown    Stroke Mother         unknown    Other (Osteoporosis) Mother     Cancer Maternal Grandmother         unknown    Heart Disease Maternal Grandmother         unknown    Stroke Maternal Grandmother         unknown    Cancer Maternal Grandfather         unknown    Cancer Paternal Grandmother         unknown    Breast Cancer Paternal Grandmother     Cancer Paternal Grandfather         unknown    Breast Cancer Maternal Cousin Female     Breast Cancer Maternal Cousin Female     Breast Cancer Maternal Cousin Female     Breast Cancer Maternal Cousin Female     Breast Cancer Maternal Cousin Female     Cancer Other       Social History:   Social History     Socioeconomic History     Marital status: Single   Tobacco Use    Smoking status: Never     Passive exposure: Never    Smokeless tobacco: Never   Vaping Use    Vaping Use: Never used   Substance and Sexual Activity    Alcohol use: No    Drug use: Never    Sexual activity: Not Currently     Partners: Male        Medications (Active prior to today's visit):  Current Outpatient Medications   Medication Sig Dispense Refill    methylPREDNISolone (MEDROL) 4 MG Oral Tablet Therapy Pack As directed. 21 each 0    tiZANidine 4 MG Oral Tab Take 1 tablet (4 mg total) by mouth every 6 (six) hours as needed. 30 tablet 0    SUMAtriptan Succinate 100 MG Oral Tab Take 1 tablet (100 mg total) by mouth daily as needed for Migraine. Take 1 tablet of the sumatriptan medication within the first 30 minutes of experiencing a migraine. If after 2 hours there is no improvement in your headache you may take a second dose. Do NOT take more than 200 mg (or basically 2 tablets) of sumatriptan in a 24 hour period. Avoid using sumatriptan more than 10 days in a month as overuse headache can occur. 30 tablet 0    meclizine 25 MG Oral Tab Take 1 tablet (25 mg total) by mouth 3 (three) times daily as needed for Dizziness. 30 tablet 0    Budesonide 1 MG/2ML Inhalation Suspension Take one container (has 1mg/2mL budesonide) 1.Open the individual medicine container (Budesonide respule) by gently twisting off the top tab. 2.Pour the liquid medicine into a small cup (measuring cup or medicine cup). A Budesonide respule contains 2 ml or less than  teaspoon of medicine. 3.Add apple sauce. Mix the solution until it is a slurry-like consistency for one dose. The goal is to make the solution into a thickened consistency with sufficient volume to coat the esophagus. 4. Swallow all the medicine solution. 5. Do not eat or drink anything for one hour after taking the medicine solution. You may rinse your mouth and spit out the water or brush your teeth after you swallow the medicine, but  do not swallow the water. 200 each 0    fluticasone propionate 50 MCG/ACT Nasal Suspension 2 sprays by Nasal route daily. 16 g 3    Omeprazole 40 MG Oral Capsule Delayed Release Take 1 capsule (40 mg total) by mouth daily. Take 1 capsule by mouth daily before breakfast. 90 capsule 3    CELLCEPT 250 MG Oral Cap Take 3 capsules twice daily 540 capsule 1    tacrolimus 1 MG Oral Cap Take 4mg am and 3mg pm 630 capsule 1    losartan 25 MG Oral Tab Take 1 tablet (25 mg total) by mouth daily. 90 tablet 3    atorvastatin 20 MG Oral Tab Take 1 tablet (20 mg total) by mouth nightly. 90 tablet 3    acetaminophen 325 MG Oral Tab Take 2 tablets (650 mg total) by mouth every 6 (six) hours as needed.         Allergies:  Allergies   Allergen Reactions    Hydrocodone-Acetaminophen OTHER (SEE COMMENTS), ANXIETY and NAUSEA AND VOMITING     [facility] Allergy Text Annotation: Vicodin           ROS:   Review of Systems   Musculoskeletal:  Positive for back pain.     Otherwise see HPI    PHYSICAL EXAM:   /80 (BP Location: Right arm, Patient Position: Sitting, Cuff Size: adult)   Pulse 70   Temp 97 °F (36.1 °C)   Resp 16   Ht 5' 4\" (1.626 m)   Wt 185 lb (83.9 kg)   LMP 01/26/2016 (Exact Date)   SpO2 99%   BMI 31.76 kg/m²  Estimated body mass index is 31.76 kg/m² as calculated from the following:    Height as of this encounter: 5' 4\" (1.626 m).    Weight as of this encounter: 185 lb (83.9 kg).   Vital signs reviewed.Appears stated age, well groomed.    Physical Exam  Constitutional:       General: She is not in acute distress.  Musculoskeletal:      Comments: +Mild tenderness across lower lumbar region bilaterally. ROM intact at spine except some mild limitation due to pain on forward flexion. Negative straight leg raise test.      Neurological:      Mental Status: She is alert.      Comments: +strength 5/5 throughout lower extremities bilaterally           ASSESSMENT/PLAN:     53 year old female presenting for lumbar pain. May be  more of a lumbar strain, but given duration and progressive development of pain symptoms would recommend imaging below.       1. Lumbar back pain  - provided handout with home physical therapy exercises   - XR LUMBAR SPINE (MIN 4 VIEWS) (CPT=72110); Future  - methylPREDNISolone (MEDROL) 4 MG Oral Tablet Therapy Pack; As directed.  Dispense: 21 each; Refill: 0  - tiZANidine 4 MG Oral Tab; Take 1 tablet (4 mg total) by mouth every 6 (six) hours as needed.  Dispense: 30 tablet; Refill: 0  - if no improvement with home physical therapy and medications above after 2 weeks (especially if x-ray unremarkable), patient to contact me for formal physical therapy referral     2. Need for vaccination  - INFLUENZA VAC, QUAD, PRSV FREE, 0.5 ML    3. Kidney transplant recipient  - needs referral renewal  - Nephrology Referral - In Network    Follow-up: as needed    Outcome: Patient verbalizes understanding. Patient is notified to call with any questions, complications, allergies, or worsening or changing symptoms.  Patient is to call with any side effects or complications from the treatments as a result of today.     Total length of visit/charting: approximately 20 min    Shade Lopez MD, 02/08/24, 7:16 AM      Please note that portions of this note may have been completed with a voice recognition program. Efforts were made to edit the dictations but occasionally words are mis-transcribed.

## 2024-02-20 ENCOUNTER — HOSPITAL ENCOUNTER (OUTPATIENT)
Dept: GENERAL RADIOLOGY | Age: 54
Discharge: HOME | End: 2024-02-20
Attending: STUDENT IN AN ORGANIZED HEALTH CARE EDUCATION/TRAINING PROGRAM
Payer: COMMERCIAL

## 2024-02-20 DIAGNOSIS — M87.059 AVASCULAR NECROSIS OF BONE OF HIP, UNSPECIFIED LATERALITY (HCC): ICD-10-CM

## 2024-02-20 PROCEDURE — 73523 X-RAY EXAM HIPS BI 5/> VIEWS: CPT | Performed by: STUDENT IN AN ORGANIZED HEALTH CARE EDUCATION/TRAINING PROGRAM

## 2024-03-04 DIAGNOSIS — Z94.0 RENAL TRANSPLANT RECIPIENT (HCC): Primary | ICD-10-CM

## 2024-03-04 RX ORDER — TACROLIMUS 1 MG/1
CAPSULE ORAL
Qty: 630 CAPSULE | Refills: 1 | Status: SHIPPED | OUTPATIENT
Start: 2024-03-04

## 2024-03-04 RX ORDER — MYCOPHENOLATE MOFETIL 250 MG/1
CAPSULE ORAL
Qty: 540 CAPSULE | Refills: 1 | Status: SHIPPED | OUTPATIENT
Start: 2024-03-04

## 2024-03-05 DIAGNOSIS — Z94.0 RENAL TRANSPLANT RECIPIENT (HCC): Primary | ICD-10-CM

## 2024-03-05 RX ORDER — TACROLIMUS 1 MG/1
CAPSULE ORAL
Qty: 630 CAPSULE | Refills: 1 | Status: SHIPPED | OUTPATIENT
Start: 2024-03-05

## 2024-03-05 RX ORDER — TACROLIMUS 1 MG/1
CAPSULE ORAL
COMMUNITY
End: 2024-03-05

## 2024-03-15 RX ORDER — LOSARTAN POTASSIUM 25 MG/1
25 TABLET ORAL DAILY
Qty: 90 TABLET | Refills: 1 | Status: SHIPPED | OUTPATIENT
Start: 2024-03-15

## 2024-03-20 DIAGNOSIS — M54.50 LUMBAR BACK PAIN: ICD-10-CM

## 2024-03-20 RX ORDER — TIZANIDINE 4 MG/1
4 TABLET ORAL EVERY 6 HOURS PRN
Qty: 30 TABLET | Refills: 0 | Status: SHIPPED | OUTPATIENT
Start: 2024-03-20

## 2024-07-19 DIAGNOSIS — Z94.0 RENAL TRANSPLANT RECIPIENT (HCC): ICD-10-CM

## 2024-07-19 RX ORDER — MYCOPHENOLATE MOFETIL 250 MG/1
CAPSULE ORAL
Qty: 540 CAPSULE | Refills: 1 | Status: SHIPPED | OUTPATIENT
Start: 2024-07-19

## 2024-08-12 ENCOUNTER — TELEPHONE (OUTPATIENT)
Facility: CLINIC | Age: 54
End: 2024-08-12

## 2024-08-12 NOTE — TELEPHONE ENCOUNTER
----- Message from Carmen KLINE sent at 9/15/2023  2:28 PM CDT -----  Regardin Year Repeat Pap & HPV  1 year repeat pap & HPV due 24

## 2024-09-01 NOTE — PRE-SEDATION ASSESSMENT
Physician Pre-Sedation Assessment    Pre-Sedation Assessment:    Sedation History: Previous Sedation with No Complications and Airway Assessed    Cardiac: normal S1, S2  Respiratory: breath sounds clear bilaterally   Abdomen: soft, BS (+), non-tender    ASA Classification: 2.  Patient with mild systemic disease    Plan:MAC sedation
Physician Pre-Sedation Assessment    Pre-Sedation Assessment:    Sedation History: Previous Sedation with No Complications and Airway Assessed    Cardiac: normal S1, S2  Respiratory: breath sounds clear bilaterally   Abdomen: soft, BS (+), non-tender    ASA Classification: 3.  Patient with severe systemic disease    Plan: MAC sedation
Patient/Mother/Father

## 2025-02-18 DIAGNOSIS — Z94.0 RENAL TRANSPLANT RECIPIENT (HCC): Primary | ICD-10-CM

## 2025-02-18 DIAGNOSIS — E78.5 HYPERLIPIDEMIA, UNSPECIFIED HYPERLIPIDEMIA TYPE: ICD-10-CM

## 2025-02-18 DIAGNOSIS — I10 ESSENTIAL HYPERTENSION: ICD-10-CM

## 2025-02-18 DIAGNOSIS — Z94.0 RENAL TRANSPLANT RECIPIENT (HCC): ICD-10-CM

## 2025-02-18 RX ORDER — TACROLIMUS 1 MG/1
CAPSULE ORAL
Qty: 210 CAPSULE | Refills: 0 | Status: SHIPPED | OUTPATIENT
Start: 2025-02-18

## 2025-02-18 RX ORDER — MYCOPHENOLATE MOFETIL 250 MG/1
CAPSULE ORAL
Qty: 180 CAPSULE | Refills: 0 | Status: SHIPPED | OUTPATIENT
Start: 2025-02-18

## 2025-02-19 ENCOUNTER — TELEPHONE (OUTPATIENT)
Dept: NEPHROLOGY | Facility: CLINIC | Age: 55
End: 2025-02-19

## 2025-02-19 NOTE — TELEPHONE ENCOUNTER
Received fax from pharmacy stating [facility] is not available for refill and clarifying if generic is ok

## 2025-02-27 LAB
ABSOLUTE BASOPHILS: 53 CELLS/UL (ref 0–200)
ABSOLUTE EOSINOPHILS: 167 CELLS/UL (ref 15–500)
ABSOLUTE LYMPHOCYTES: 4558 CELLS/UL (ref 850–3900)
ABSOLUTE MONOCYTES: 458 CELLS/UL (ref 200–950)
ABSOLUTE NEUTROPHILS: 3564 CELLS/UL (ref 1500–7800)
ALBUMIN/GLOBULIN RATIO: 1.4 (CALC) (ref 1–2.5)
ALBUMIN: 4.7 G/DL (ref 3.6–5.1)
ALKALINE PHOSPHATASE: 106 U/L (ref 37–153)
ALT: 15 U/L (ref 6–29)
APPEARANCE: CLEAR
AST: 21 U/L (ref 10–35)
BASOPHILS: 0.6 %
BILIRUBIN, TOTAL: 0.9 MG/DL (ref 0.2–1.2)
BILIRUBIN: NEGATIVE
BUN: 18 MG/DL (ref 7–25)
CALCIUM: 10.1 MG/DL (ref 8.6–10.4)
CARBON DIOXIDE: 26 MMOL/L (ref 20–32)
CHLORIDE: 102 MMOL/L (ref 98–110)
CHOL/HDLC RATIO: 3.4 (CALC)
CHOLESTEROL, TOTAL: 200 MG/DL
COLOR: YELLOW
CREATININE, RANDOM URINE: 122 MG/DL (ref 20–275)
CREATININE: 0.97 MG/DL (ref 0.5–1.03)
EGFR: 69 ML/MIN/1.73M2
EOSINOPHILS: 1.9 %
GLOBULIN: 3.3 G/DL (CALC) (ref 1.9–3.7)
GLUCOSE: 79 MG/DL (ref 65–99)
GLUCOSE: NEGATIVE
HDL CHOLESTEROL: 58 MG/DL
HEMATOCRIT: 43.7 % (ref 35–45)
HEMOGLOBIN: 13.9 G/DL (ref 11.7–15.5)
KETONES: NEGATIVE
LDL-CHOLESTEROL: 127 MG/DL (CALC)
LEUKOCYTE ESTERASE: NEGATIVE
LYMPHOCYTES: 51.8 %
MCH: 26.1 PG (ref 27–33)
MCHC: 31.8 G/DL (ref 32–36)
MCV: 82.1 FL (ref 80–100)
MICROALBUMIN/CREATININE RATIO, RANDOM URINE: 26 MG/G CREAT
MICROALBUMIN: 3.2 MG/DL
MONOCYTES: 5.2 %
MPV: 9.9 FL (ref 7.5–12.5)
NEUTROPHILS: 40.5 %
NITRITE: NEGATIVE
NON-HDL CHOLESTEROL: 142 MG/DL (CALC)
OCCULT BLOOD: NEGATIVE
PLATELET COUNT: 397 THOUSAND/UL (ref 140–400)
POTASSIUM: 4.3 MMOL/L (ref 3.5–5.3)
PROTEIN, TOTAL: 8 G/DL (ref 6.1–8.1)
PROTEIN: NEGATIVE
RDW: 12.8 % (ref 11–15)
RED BLOOD CELL COUNT: 5.32 MILLION/UL (ref 3.8–5.1)
SODIUM: 137 MMOL/L (ref 135–146)
SPECIFIC GRAVITY: 1.02 (ref 1–1.03)
TACROLIMUS, HIGHLY SENSITIVE, /MS/MS: 22.6 MCG/L
TACROLIMUS, HIGHLY SENSITIVE, /MS/MS: 22.6 MCG/L
TRIGLYCERIDES: 60 MG/DL
WHITE BLOOD CELL COUNT: 8.8 THOUSAND/UL (ref 3.8–10.8)

## 2025-03-03 ENCOUNTER — OFFICE VISIT (OUTPATIENT)
Dept: NEPHROLOGY | Facility: CLINIC | Age: 55
End: 2025-03-03
Payer: MEDICAID

## 2025-03-03 VITALS — WEIGHT: 190.13 LBS | SYSTOLIC BLOOD PRESSURE: 122 MMHG | DIASTOLIC BLOOD PRESSURE: 76 MMHG | BODY MASS INDEX: 33 KG/M2

## 2025-03-03 DIAGNOSIS — E78.5 HYPERLIPIDEMIA, UNSPECIFIED HYPERLIPIDEMIA TYPE: ICD-10-CM

## 2025-03-03 DIAGNOSIS — I10 ESSENTIAL HYPERTENSION: ICD-10-CM

## 2025-03-03 DIAGNOSIS — Z94.0 RENAL TRANSPLANT RECIPIENT (HCC): Primary | ICD-10-CM

## 2025-03-03 RX ORDER — TACROLIMUS 1 MG/1
3 CAPSULE ORAL EVERY EVENING
Qty: 270 CAPSULE | Refills: 3 | Status: SHIPPED | OUTPATIENT
Start: 2025-03-03

## 2025-03-03 RX ORDER — MYCOPHENOLATE MOFETIL 250 MG/1
750 CAPSULE ORAL
Qty: 540 CAPSULE | Refills: 3 | Status: SHIPPED | OUTPATIENT
Start: 2025-03-03

## 2025-03-03 RX ORDER — TACROLIMUS 1 MG/1
4 CAPSULE ORAL EVERY MORNING
Qty: 360 CAPSULE | Refills: 3 | Status: SHIPPED | OUTPATIENT
Start: 2025-03-03

## 2025-03-03 NOTE — PROGRESS NOTES
Nephrology Progress Note      Sheela Gillespie is a 54 year old female.    HPI:     Chief Complaint   Patient presents with    Renal Transplant    Hypertension       Sheela Gillespie was seen in the nephrology clinic today in follow-up for management of hypertension in the setting of renal transplant performed January 10, 2010.  Since her last clinic visit she reports that she had an episode of left lower quadrant abdominal pain which prompted emergency visit approximately 2 weeks ago or so.  Unfortunately she had been out of her meds for 1 to 2 weeks prior to this as she had concerns about being able to afford them thinking that she did not have insurance.  However, she does have insurance and was able to have a refill on her Prograf and mycophenolate.  For the last few weeks she has been feeling better and denies complaints.  Home blood pressures are typically excellent      HISTORY:  Past Medical History:    Constipation    Always    Extrinsic asthma, unspecified    Pt believes she has grown out of this.    Focal segmental glomerulosclerosis    Heart murmur    High blood pressure    High cholesterol    Iritis, secondary    Kidney transplant recipient (HCC)    Renal disorder      Past Surgical History:   Procedure Laterality Date    Colonoscopy      [place]    [place]      [place]    Organ transplant  01/29/2010    kidney      Family History   Problem Relation Age of Onset    Cancer Father         pancreatic, lung , throat     Hypertension Mother         unknown    Stroke Mother         unknown    Other (Osteoporosis) Mother     Cancer Maternal Grandmother         unknown    Heart Disease Maternal Grandmother         unknown    Stroke Maternal Grandmother         unknown    Cancer Maternal Grandfather         unknown    Cancer Paternal Grandmother         unknown    Breast Cancer Paternal Grandmother     Cancer Paternal Grandfather         unknown    Breast Cancer Maternal Cousin Female     Breast Cancer Maternal  Cousin Female     Breast Cancer Maternal Cousin Female     Breast Cancer Maternal Cousin Female     Breast Cancer Maternal Cousin Female     Cancer Other       Social History:   Social History     Socioeconomic History    Marital status: Single   Tobacco Use    Smoking status: Never     Passive exposure: Never    Smokeless tobacco: Never   Vaping Use    Vaping status: Never Used   Substance and Sexual Activity    Alcohol use: No    Drug use: Never    Sexual activity: Not Currently     Partners: Male        Medications (Active prior to today's visit):  Current Outpatient Medications   Medication Sig Dispense Refill    CELLCEPT 250 MG Oral Cap TAKE 3 CAPSULES BY MOUTH TWICE  DAILY 180 capsule 0    tacrolimus (PROGRAF) 1 MG Oral Cap Take 4 tabs in am and 3 tabs in pm by mouth 210 capsule 0    tiZANidine 4 MG Oral Tab Take 1 tablet (4 mg total) by mouth every 6 (six) hours as needed. 30 tablet 0    losartan 25 MG Oral Tab Take 1 tablet (25 mg total) by mouth daily. 90 tablet 1    methylPREDNISolone (MEDROL) 4 MG Oral Tablet Therapy Pack As directed. 21 each 0    SUMAtriptan Succinate 100 MG Oral Tab Take 1 tablet (100 mg total) by mouth daily as needed for Migraine. Take 1 tablet of the sumatriptan medication within the first 30 minutes of experiencing a migraine. If after 2 hours there is no improvement in your headache you may take a second dose. Do NOT take more than 200 mg (or basically 2 tablets) of sumatriptan in a 24 hour period. Avoid using sumatriptan more than 10 days in a month as overuse headache can occur. 30 tablet 0    meclizine 25 MG Oral Tab Take 1 tablet (25 mg total) by mouth 3 (three) times daily as needed for Dizziness. 30 tablet 0    Budesonide 1 MG/2ML Inhalation Suspension Take one container (has 1mg/2mL budesonide) 1.Open the individual medicine container (Budesonide respule) by gently twisting off the top tab. 2.Pour the liquid medicine into a small cup (measuring cup or medicine cup). A  Budesonide respule contains 2 ml or less than  teaspoon of medicine. 3.Add apple sauce. Mix the solution until it is a slurry-like consistency for one dose. The goal is to make the solution into a thickened consistency with sufficient volume to coat the esophagus. 4. Swallow all the medicine solution. 5. Do not eat or drink anything for one hour after taking the medicine solution. You may rinse your mouth and spit out the water or brush your teeth after you swallow the medicine, but do not swallow the water. 200 each 0    fluticasone propionate 50 MCG/ACT Nasal Suspension 2 sprays by Nasal route daily. 16 g 3    Omeprazole 40 MG Oral Capsule Delayed Release Take 1 capsule (40 mg total) by mouth daily. Take 1 capsule by mouth daily before breakfast. 90 capsule 3    atorvastatin 20 MG Oral Tab Take 1 tablet (20 mg total) by mouth nightly. 90 tablet 3    acetaminophen 325 MG Oral Tab Take 2 tablets (650 mg total) by mouth every 6 (six) hours as needed.         Allergies:  Allergies[1]      ROS:     Denies fever/chills  Denies wt loss/gain  Denies HA or visual changes  Denies CP or palpitations  Denies SOB/cough/hemoptysis  Denies abd or flank pain  Denies N/V/D  Denies change in urinary habits or gross hematuria  Denies LE edema  Denies skin rashes/myalgias/arthralgias      PHYSICAL EXAM:   /76   Wt 190 lb 2 oz (86.2 kg)   LMP 01/26/2016 (Exact Date)   BMI 32.63 kg/m²   Wt Readings from Last 6 Encounters:   03/03/25 190 lb 2 oz (86.2 kg)   02/08/24 194 lb (88 kg)   02/08/24 185 lb (83.9 kg)   11/21/23 188 lb (85.3 kg)   10/16/23 184 lb (83.5 kg)   09/11/23 185 lb (83.9 kg)       General: Alert and oriented in no apparent distress.  HEENT: No scleral icterus, MMM  Neck: Supple, no MARELY or thyromegaly  Cardiac: Regular rate and rhythm, S1, S2 normal, no murmur or rub  Lungs: Clear without wheezes, rales, rhonchi.    Abdomen: Soft, non-tender. + bowel sounds, transplant is palpable in the left lower quadrant and  is nontender  Extremities: Without clubbing, cyanosis or edema.  Neurologic: Alert and oriented, normal affect, cranial nerves grossly intact, moving all extremities  Skin: Warm and dry, no rashes      LABS:     Lab Results   Component Value Date    BUN 18 02/26/2025    BUNCREA SEE NOTE: 02/26/2025    CREATSERUM 0.97 02/26/2025    GFR 72 03/31/2016    GFRNAA 64 06/27/2022    GFRAA 75 06/27/2022    CA 10.1 02/26/2025    ALKPHO 106 02/26/2025    AST 21 02/26/2025    ALT 15 02/26/2025    BILT 0.9 02/26/2025    TP 8.0 02/26/2025    ALB 4.7 02/26/2025    GLOBULIN 3.3 02/26/2025    AGRATIO 1.4 02/26/2025     02/26/2025    K 4.3 02/26/2025     02/26/2025    CO2 26 02/26/2025     Lab Results   Component Value Date    RBC 5.32 (H) 02/26/2025    HGB 13.9 02/26/2025    HCT 43.7 02/26/2025     02/26/2025    MPV 9.0 (L) 02/15/2013    MCV 82.1 02/26/2025    MCH 26.1 (L) 02/26/2025    MCHC 31.8 (L) 02/26/2025    RDW 12.8 02/26/2025    NEPRELIM 5.09 03/31/2016    NEPERCENT 40.5 02/26/2025    LYPERCENT 51.8 02/26/2025    MOPERCENT 5.2 02/26/2025    EOPERCENT 1.9 02/26/2025    BAPERCENT 0.6 02/26/2025    NE 3,564 02/26/2025    LYMABS 4,558 (H) 02/26/2025    MOABSO 458 02/26/2025    EOABSO 167 02/26/2025    BAABSO 53 02/26/2025     Lab Results   Component Value Date    CREUR 103.3 10/04/2014    MICROALBUMIN 3.2 02/26/2025    CREAUR 122 02/26/2025    MALBCREACALC 26 02/26/2025     Lab Results   Component Value Date    CLARITY CLEAR 02/26/2025    SPECGRAVITY 1.020 02/26/2025    GLUUR NEGATIVE 02/26/2025    BILUR NEGATIVE 02/26/2025    KETUR NEGATIVE 02/26/2025    BLOODURINE Moderate (A) 04/01/2016    PHURINE < OR = 5.0 02/26/2025    PROUR NEGATIVE 02/26/2025    UROBILINOGEN <2.0 04/01/2016    NITRITE NEGATIVE 02/26/2025    LEUUR NEGATIVE 02/26/2025    NMIC NOT INDICATED 02/15/2013    WBCUR NONE SEEN 02/26/2025    RBCUR NONE SEEN 02/26/2025    EPIUR 0-5 02/26/2025    BACUR NONE SEEN 02/26/2025    HYLUR NONE SEEN  02/26/2025     ASSESSMENT/PLAN:     #1.  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  Fortunately her kidney remains normal and overall she is doing quite well.  Most recent creatinine was at 0.97 mg/dL.  She will continue her current doses of Prograf 4 mg in the morning 3 mg in the evening and mycophenolate 750 mg twice daily.    #2.  Hypertension-blood pressure remains excellent she will continue her current dose of losartan 25 mg daily    #3.  Hyperlipidemia-she had been on atorvastatin in the past.  Will repeat a lipid panel with her next blood draw      Silvino Mcdonnell MD  3/3/2025  9:48 AM             [1]   Allergies  Allergen Reactions    Hydrocodone-Acetaminophen OTHER (SEE COMMENTS), ANXIETY and NAUSEA AND VOMITING     [facility] Allergy Text Annotation: Vicodin

## 2025-03-05 DIAGNOSIS — Z94.0 RENAL TRANSPLANT RECIPIENT (HCC): ICD-10-CM

## 2025-03-05 RX ORDER — TACROLIMUS 1 MG/1
CAPSULE ORAL
Qty: 210 CAPSULE | Refills: 5 | Status: SHIPPED | OUTPATIENT
Start: 2025-03-05

## 2025-03-25 ENCOUNTER — TELEPHONE (OUTPATIENT)
Dept: NEPHROLOGY | Facility: CLINIC | Age: 55
End: 2025-03-25

## 2025-03-25 NOTE — TELEPHONE ENCOUNTER
Received fax from [facility] pharmacy requesting prior authorization for cellcept. Prescription was last sent to [facility] on file. Called [facility] pharmacy to clarify. Pharmacist stated patient last picked up 30 day supply on 3/21 and order has 11 refills

## (undated) DEVICE — KIT ENDO ORCAPOD 160/180/190

## (undated) DEVICE — YANKAUER SUCTION INSTRUMENT NO CONTROL VENT, BULB TIP, CLEAR: Brand: YANKAUER

## (undated) DEVICE — 60 ML SYRINGE REGULAR TIP: Brand: MONOJECT

## (undated) DEVICE — MEDI-VAC NON-CONDUCTIVE SUCTION TUBING: Brand: CARDINAL HEALTH

## (undated) DEVICE — Device: Brand: DUAL NARE NASAL CANNULAE FEMALE LUER CON 7FT O2 TUBE

## (undated) DEVICE — FORCEPS BX L240CM DIA2.4MM L NDL RAD JAW 4

## (undated) DEVICE — CONMED SCOPE SAVER BITE BLOCK, 20X27 MM: Brand: SCOPE SAVER

## (undated) DEVICE — KIT CLEAN ENDOKIT 1.1OZ GOWNX2

## (undated) DEVICE — MEDI-VAC NON-CONDUCTIVE SUCTION TUBING 6MM X 1.8M (6FT.) L: Brand: CARDINAL HEALTH

## (undated) DEVICE — FORCEP RADIAL JAW 4

## (undated) NOTE — LETTER
[address], [place], [place]  Authorization for Surgical Operation and Procedure                                                                                           I hereby authorize Lupe Bravo MD, my physician and his/her assistants (if applicable), which may include medical students, residents, and/or fellows, to perform the following surgical operation/ procedure and administer such anesthesia as may be determined necessary by my physician: Operation/Procedure name (s) ESOPHAGOGASTRODUODENOSCOPY on [address]   2. I recognize that during the surgical operation/procedure, unforeseen conditions may necessitate additional or different procedures than those listed above. I, therefore, further authorize and request that the above-named surgeon, assistants, or designees perform such procedures as are, in their judgment, necessary and desirable. 3.   My surgeon/physician has discussed prior to my surgery the potential benefits, risks and side effects of this procedure; the likelihood of achieving goals; and potential problems that might occur during recuperation. They also discussed reasonable alternatives to the procedure, including risks, benefits, and side effects related to the alternatives and risks related to not receiving this procedure. I have had all my questions answered and I acknowledge that no guarantee has been made as to the result that may be obtained. 4.   Should the need arise during my operation/procedure, which includes change of level of care prior to discharge, I also consent to the administration of blood and/or blood products. Further, I understand that despite careful testing and screening of blood or blood products by collecting agencies, I may still be subject to ill effects as a result of receiving a blood transfusion and/or blood products.   The following are some, but not all, of the potential risks that can occur: fever and allergic reactions, hemolytic reactions, transmission of diseases such as Hepatitis, AIDS and Cytomegalovirus (CMV) and fluid overload. In the event that I wish to have an autologous transfusion of my own blood, or a directed donor transfusion, I will discuss this with my physician. Check only if Refusing Blood or Blood Products  I understand refusal of blood or blood products as deemed necessary by my physician may have serious consequences to my condition to include possible death. I hereby assume responsibility for my refusal and release the hospital, its personnel, and my physicians from any responsibility for the consequences of my refusal.    o  Refuse   5. I authorize the use of any specimen, organs, tissues, body parts or foreign objects that may be removed from my body during the operation/procedure for diagnosis, research or teaching purposes and their subsequent disposal by hospital authorities. I also authorize the release of specimen test results and/or written reports to my treating physician on the hospital medical staff or other referring or consulting physicians involved in my care, at the discretion of the Pathologist or my treating physician. 6.   I consent to the photographing or videotaping of the operations or procedures to be performed, including appropriate portions of my body for medical, scientific, or educational purposes, provided my identity is not revealed by the pictures or by descriptive texts accompanying them. If the procedure has been photographed/videotaped, the surgeon will obtain the original picture, image, videotape or CD. The hospital will not be responsible for storage, release or maintenance of the picture, image, tape or CD.    7.   I consent to the presence of a  or observers in the operating room as deemed necessary by my physician or their designees.     8.   I recognize that in the event my procedure results in extended X-Ray/fluoroscopy time, I may develop a skin reaction. 9. If I have a Do Not Attempt Resuscitation (DNAR) order in place, that status will be suspended while in the operating room, procedural suite, and during the recovery period unless otherwise explicitly stated by me (or a person authorized to consent on my behalf). The surgeon or my attending physician will determine when the applicable recovery period ends for purposes of reinstating the DNAR order. 10. Patients having a sterilization procedure: I understand that if the procedure is successful the results will be permanent and it will therefore be impossible for me to inseminate, conceive, or bear children. I also understand that the procedure is intended to result in sterility, although the result has not been guaranteed. 11. I acknowledge that my physician has explained sedation/analgesia administration to me including the risk and benefits I consent to the administration of sedation/analgesia as may be necessary or desirable in the judgment of my physician. I CERTIFY THAT I HAVE READ AND FULLY UNDERSTAND THE ABOVE CONSENT TO OPERATION and/or OTHER PROCEDURE.     _________________________________________ _________________________________     ___________________________________  Signature of Patient     Signature of Responsible Person                   Printed Name of Responsible Person                              _________________________________________ ______________________________        ___________________________________  Signature of Witness         Date  Time         Relationship to Patient    STATEMENT OF PHYSICIAN My signature below affirms that prior to the time of the procedure; I have explained to the patient and/or his/her legal representative, the risks and benefits involved in the proposed treatment and any reasonable alternative to the proposed treatment.  I have also explained the risks and benefits involved in refusal of the proposed treatment and alternatives to the proposed treatment and have answered the patient's questions.  If I have a significant financial interest in a co-management agreement or a significant financial interest in any product or implant, or other significant relationship used in this procedure/surgery, I have disclosed this and had a discussion with my patient.     _______________________________________________________________ _____________________________  Jordan Ojeda)                                                                                         (Date)                                   (Time)  Patient Name: Wesley Llamas    : 1970   Printed: 10/13/2023      Medical Record #: [id number]                                              Page 1 of 1

## (undated) NOTE — LETTER
[address], [place], [place]  Authorization for Surgical Operation and Procedure                                                                                           I hereby authorize Heladio Mahajan MD, my physician and his/her assistants (if applicable), which may include medical students, residents, and/or fellows, to perform the following surgical operation/ procedure and administer such anesthesia as may be determined necessary by my physician: Operation/Procedure name (s) ESOPHAGOGASTRODUODENOSCOPY with possible dilation on Sheela Gillespie   2. I recognize that during the surgical operation/procedure, unforeseen conditions may necessitate additional or different procedures than those listed above. I, therefore, further authorize and request that the above-named surgeon, assistants, or designees perform such procedures as are, in their judgment, necessary and desirable. 3.   My surgeon/physician has discussed prior to my surgery the potential benefits, risks and side effects of this procedure; the likelihood of achieving goals; and potential problems that might occur during recuperation. They also discussed reasonable alternatives to the procedure, including risks, benefits, and side effects related to the alternatives and risks related to not receiving this procedure. I have had all my questions answered and I acknowledge that no guarantee has been made as to the result that may be obtained. 4.   Should the need arise during my operation/procedure, which includes change of level of care prior to discharge, I also consent to the administration of blood and/or blood products. Further, I understand that despite careful testing and screening of blood or blood products by collecting agencies, I may still be subject to ill effects as a result of receiving a blood transfusion and/or blood products.   The following are some, but not all, of the potential risks that can occur: fever and allergic reactions, hemolytic reactions, transmission of diseases such as Hepatitis, AIDS and Cytomegalovirus (CMV) and fluid overload. In the event that I wish to have an autologous transfusion of my own blood, or a directed donor transfusion, I will discuss this with my physician. Check only if Refusing Blood or Blood Products  I understand refusal of blood or blood products as deemed necessary by my physician may have serious consequences to my condition to include possible death. I hereby assume responsibility for my refusal and release the hospital, its personnel, and my physicians from any responsibility for the consequences of my refusal.    o  Refuse   5. I authorize the use of any specimen, organs, tissues, body parts or foreign objects that may be removed from my body during the operation/procedure for diagnosis, research or teaching purposes and their subsequent disposal by hospital authorities. I also authorize the release of specimen test results and/or written reports to my treating physician on the hospital medical staff or other referring or consulting physicians involved in my care, at the discretion of the Pathologist or my treating physician. 6.   I consent to the photographing or videotaping of the operations or procedures to be performed, including appropriate portions of my body for medical, scientific, or educational purposes, provided my identity is not revealed by the pictures or by descriptive texts accompanying them. If the procedure has been photographed/videotaped, the surgeon will obtain the original picture, image, videotape or CD. The hospital will not be responsible for storage, release or maintenance of the picture, image, tape or CD.    7.   I consent to the presence of a  or observers in the operating room as deemed necessary by my physician or their designees.     8.   I recognize that in the event my procedure results in extended X-Ray/fluoroscopy time, I may develop a skin reaction. 9. If I have a Do Not Attempt Resuscitation (DNAR) order in place, that status will be suspended while in the operating room, procedural suite, and during the recovery period unless otherwise explicitly stated by me (or a person authorized to consent on my behalf). The surgeon or my attending physician will determine when the applicable recovery period ends for purposes of reinstating the DNAR order. 10. Patients having a sterilization procedure: I understand that if the procedure is successful the results will be permanent and it will therefore be impossible for me to inseminate, conceive, or bear children. I also understand that the procedure is intended to result in sterility, although the result has not been guaranteed. 11. I acknowledge that my physician has explained sedation/analgesia administration to me including the risk and benefits I consent to the administration of sedation/analgesia as may be necessary or desirable in the judgment of my physician. I CERTIFY THAT I HAVE READ AND FULLY UNDERSTAND THE ABOVE CONSENT TO OPERATION and/or OTHER PROCEDURE.     _________________________________________ _________________________________     ___________________________________  Signature of Patient     Signature of Responsible Person                   Printed Name of Responsible Person                              _________________________________________ ______________________________        ___________________________________  Signature of Witness         Date  Time         Relationship to Patient    STATEMENT OF PHYSICIAN My signature below affirms that prior to the time of the procedure; I have explained to the patient and/or his/her legal representative, the risks and benefits involved in the proposed treatment and any reasonable alternative to the proposed treatment.  I have also explained the risks and benefits involved in refusal of the proposed treatment and alternatives to the proposed treatment and have answered the patient's questions.  If I have a significant financial interest in a co-management agreement or a significant financial interest in any product or implant, or other significant relationship used in this procedure/surgery, I have disclosed this and had a discussion with my patient.     _______________________________________________________________ _____________________________  Lakeshia Listen of Physician)                                                                                         (Date)                                   (Time)  Patient Name: Jorge Hernandez    : 1970   Printed: 2023      Medical Record #: [id number]                                              Page 1 of 1

## (undated) NOTE — LETTER
4/11/2023              [address] [facility]         Dear Mars Cervantes,    [facility] records indicate that the tests ordered for you by Joyce Washington  have not been done. If you have, in fact, already completed the tests or you do not wish to have the tests done, please contact our office at [address] [place] [address]. Otherwise, please proceed with the testing. Enclosed is a duplicate order for your convenience. Please call [facility] at [phone] to schedule this test order      CBC WITH DIFFERENTIAL WITH PLATELET [[id number]        Sincerely,    Joyce Washington  [facility] [address]  [address] [place] [address] [address] [id number] [phone]

## (undated) NOTE — LETTER
[facility] [place]  After Care Instructions for Colposcopy/Biopsy      Biopsy Results   You will receive a phone call with your biopsy results in 7 business days. If you have not received your biopsy results in 7 days, please contact our office. Your biopsy results will help the physician decide if and what further treatment is  necessary. Bleeding   After your biopsy, the area is swabbed with a brown solution to help stop any bleeding. For this reason, you may notice a brown or black clotty discharge lasting several days. If you experience bright red bleeding that is heavy, you should call the office. Restrictions    You should avoid douching, intercourse and tampon use for 3 days following your procedure. Pain    If you are uncomfortable after the procedure, you may use Aleve, Tylenol or Ibuprofen for the discomfort. If you have additional questions or concerns, please call us at [phone].

## (undated) NOTE — LETTER
[address], [place], [place]  Authorization for Invasive Procedure                                                                                           1. I hereby authorize Carlos Trinidad MD, my physician and his/her assistants (if applicable), which may include medical students, residents, and/or fellows, to perform the following surgical operation/ procedure and administer such anesthesia as may be determined necessary by my physician: Operation/Procedure name (s) ESOPHAGOGASTRODUODENOSCOPY on [address]   2. I recognize that during the surgical operation/procedure, unforeseen conditions may necessitate additional or different procedures than those listed above. I, therefore, further authorize and request that the above-named surgeon, assistants, or designees perform such procedures as are, in their judgment, necessary and desirable. 3.   My surgeon/physician has discussed prior to my surgery the potential benefits, risks and side effects of this procedure; the likelihood of achieving goals; and potential problems that might occur during recuperation. They also discussed reasonable alternatives to the procedure, including risks, benefits, and side effects related to the alternatives and risks related to not receiving this procedure. I have had all my questions answered and I acknowledge that no guarantee has been made as to the result that may be obtained. 4.   Should the need arise during my operation/procedure, which includes change of level of care prior to discharge, I also consent to the administration of blood and/or blood products. Further, I understand that despite careful testing and screening of blood or blood products by collecting agencies, I may still be subject to ill effects as a result of receiving a blood transfusion and/or blood products.   The following are some, but not all, of the potential risks that can occur: fever and allergic reactions, hemolytic reactions, transmission of diseases such as Hepatitis, AIDS and Cytomegalovirus (CMV) and fluid overload. In the event that I wish to have an autologous transfusion of my own blood, or a directed donor transfusion, I will discuss this with my physician. Check only if Refusing Blood or Blood Products  I understand refusal of blood or blood products as deemed necessary by my physician may have serious consequences to my condition to include possible death. I hereby assume responsibility for my refusal and release the hospital, its personnel, and my physicians from any responsibility for the consequences of my refusal.    o  Refuse   5. I authorize the use of any specimen, organs, tissues, body parts or foreign objects that may be removed from my body during the operation/procedure for diagnosis, research or teaching purposes and their subsequent disposal by hospital authorities. I also authorize the release of specimen test results and/or written reports to my treating physician on the hospital medical staff or other referring or consulting physicians involved in my care, at the discretion of the Pathologist or my treating physician. 6.   I consent to the photographing or videotaping of the operations or procedures to be performed, including appropriate portions of my body for medical, scientific, or educational purposes, provided my identity is not revealed by the pictures or by descriptive texts accompanying them. If the procedure has been photographed/videotaped, the surgeon will obtain the original picture, image, videotape or CD. The hospital will not be responsible for storage, release or maintenance of the picture, image, tape or CD.    7.   I consent to the presence of a  or observers in the operating room as deemed necessary by my physician or their designees.     8.   I recognize that in the event my procedure results in extended X-Ray/fluoroscopy time, I may develop a skin reaction. 9. If I have a Do Not Attempt Resuscitation (DNAR) order in place, that status will be suspended while in the operating room, procedural suite, and during the recovery period unless otherwise explicitly stated by me (or a person authorized to consent on my behalf). The surgeon or my attending physician will determine when the applicable recovery period ends for purposes of reinstating the DNAR order. 10. Patients having a sterilization procedure: I understand that if the procedure is successful the results will be permanent and it will therefore be impossible for me to inseminate, conceive, or bear children. I also understand that the procedure is intended to result in sterility, although the result has not been guaranteed. 11. I acknowledge that my physician has explained sedation/analgesia administration to me including the risk and benefits I consent to the administration of sedation/analgesia as may be necessary or desirable in the judgment of my physician. I CERTIFY THAT I HAVE READ AND FULLY UNDERSTAND THE ABOVE CONSENT TO OPERATION and/or OTHER PROCEDURE.     _________________________________________ _________________________________     ___________________________________  Signature of Patient     Signature of Responsible Person                   Printed Name of Responsible Person                              _________________________________________ ______________________________        ___________________________________  Signature of Witness         Date  Time         Relationship to Patient    STATEMENT OF PHYSICIAN My signature below affirms that prior to the time of the procedure; I have explained to the patient and/or his/her legal representative, the risks and benefits involved in the proposed treatment and any reasonable alternative to the proposed treatment.  I have also explained the risks and benefits involved in refusal of the proposed treatment and alternatives to the proposed treatment and have answered the patient's questions.  If I have a significant financial interest in a co-management agreement or a significant financial interest in any product or implant, or other significant relationship used in this procedure/surgery, I have disclosed this and had a discussion with my patient.     _______________________________________________________________ _____________________________  Lucio Puckett)                                                                                         (Date)                                   (Time)  Patient Name: Mariano Martínez    : 1970   Printed: 3/16/2023      Medical Record #: [id number]                                              Page 1 of 1